# Patient Record
Sex: MALE | Race: WHITE | URBAN - METROPOLITAN AREA
[De-identification: names, ages, dates, MRNs, and addresses within clinical notes are randomized per-mention and may not be internally consistent; named-entity substitution may affect disease eponyms.]

---

## 2017-04-12 ENCOUNTER — HISTORICAL (OUTPATIENT)
Dept: ADMINISTRATIVE | Facility: HOSPITAL | Age: 82
End: 2017-04-12

## 2017-04-27 ENCOUNTER — HISTORICAL (OUTPATIENT)
Dept: ADMINISTRATIVE | Facility: HOSPITAL | Age: 82
End: 2017-04-27

## 2018-09-19 ENCOUNTER — HISTORICAL (OUTPATIENT)
Dept: ADMINISTRATIVE | Facility: HOSPITAL | Age: 83
End: 2018-09-19

## 2018-09-19 LAB
ABS NEUT (OLG): 4.05 X10(3)/MCL (ref 2.1–9.2)
ALBUMIN SERPL-MCNC: 3.1 GM/DL (ref 3.4–5)
ALBUMIN/GLOB SERPL: 1 {RATIO}
ALP SERPL-CCNC: 67 UNIT/L (ref 45–117)
ALT SERPL-CCNC: 44 UNIT/L (ref 16–61)
AST SERPL-CCNC: 51 UNIT/L (ref 15–37)
BASOPHILS # BLD AUTO: 0.02 X10(3)/MCL (ref 0–0.2)
BASOPHILS NFR BLD AUTO: 0.3 % (ref 0–0.9)
BILIRUB SERPL-MCNC: 0.3 MG/DL (ref 0.2–1)
BILIRUBIN DIRECT+TOT PNL SERPL-MCNC: <0.1 MG/DL (ref 0–0.2)
BILIRUBIN DIRECT+TOT PNL SERPL-MCNC: >0.2 MG/DL (ref 0–1)
BUN SERPL-MCNC: 49 MG/DL (ref 7–18)
CALCIUM SERPL-MCNC: 9 MG/DL (ref 8.5–10.1)
CHLORIDE SERPL-SCNC: 108 MMOL/L (ref 98–107)
CO2 SERPL-SCNC: 26 MMOL/L (ref 21–32)
CREAT SERPL-MCNC: 1.74 MG/DL (ref 0.7–1.3)
EOSINOPHIL # BLD AUTO: 0.22 X10(3)/MCL (ref 0–0.9)
EOSINOPHIL NFR BLD AUTO: 3.3 % (ref 0–6.5)
ERYTHROCYTE [DISTWIDTH] IN BLOOD BY AUTOMATED COUNT: 14.6 % (ref 11.5–17)
GLOBULIN SER-MCNC: 4 GM/DL (ref 2–4)
GLUCOSE SERPL-MCNC: 114 MG/DL (ref 74–106)
HCT VFR BLD AUTO: 36.6 % (ref 42–52)
HGB BLD-MCNC: 12.2 GM/DL (ref 14–18)
IMM GRANULOCYTES # BLD AUTO: 0.02 10*3/UL (ref 0–0.02)
IMM GRANULOCYTES NFR BLD AUTO: 0.3 % (ref 0–0.43)
LYMPHOCYTES # BLD AUTO: 1.64 X10(3)/MCL (ref 0.6–4.6)
LYMPHOCYTES NFR BLD AUTO: 24.8 % (ref 16.2–38.3)
MCH RBC QN AUTO: 29.2 PG (ref 27–31)
MCHC RBC AUTO-ENTMCNC: 33.3 GM/DL (ref 33–36)
MCV RBC AUTO: 87.6 FL (ref 80–94)
MONOCYTES # BLD AUTO: 0.66 X10(3)/MCL (ref 0.1–1.3)
MONOCYTES NFR BLD AUTO: 10 % (ref 4.7–11.3)
NEUTROPHILS # BLD AUTO: 4.05 X10(3)/MCL (ref 2.1–9.2)
NEUTROPHILS NFR BLD AUTO: 61.3 % (ref 49.1–73.4)
NRBC BLD AUTO-RTO: 0 % (ref 0–0.2)
PLATELET # BLD AUTO: 164 X10(3)/MCL (ref 130–400)
PMV BLD AUTO: 10.9 FL (ref 7.4–10.4)
POTASSIUM SERPL-SCNC: 3.6 MMOL/L (ref 3.5–5.1)
PREALB SERPL-MCNC: 20.6 MG/DL (ref 20–40)
PROT SERPL-MCNC: 6.8 GM/DL (ref 6.4–8.2)
RBC # BLD AUTO: 4.18 X10(6)/MCL (ref 4.7–6.1)
SODIUM SERPL-SCNC: 146 MMOL/L (ref 136–145)
WBC # SPEC AUTO: 6.6 X10(3)/MCL (ref 4.5–11.5)

## 2018-09-24 LAB
ABS NEUT (OLG): 4.85 X10(3)/MCL (ref 2.1–9.2)
ALBUMIN SERPL-MCNC: 2.8 GM/DL (ref 3.4–5)
BASOPHILS # BLD AUTO: 0.02 X10(3)/MCL (ref 0–0.2)
BASOPHILS NFR BLD AUTO: 0.3 % (ref 0–0.9)
BUN SERPL-MCNC: 42 MG/DL (ref 7–18)
CALCIUM SERPL-MCNC: 8.6 MG/DL (ref 8.5–10.1)
CHLORIDE SERPL-SCNC: 111 MMOL/L (ref 98–107)
CO2 SERPL-SCNC: 27 MMOL/L (ref 21–32)
CREAT SERPL-MCNC: 1.94 MG/DL (ref 0.7–1.3)
CREAT/UREA NIT SERPL: 22
EOSINOPHIL # BLD AUTO: 0.19 X10(3)/MCL (ref 0–0.9)
EOSINOPHIL NFR BLD AUTO: 2.7 % (ref 0–6.5)
ERYTHROCYTE [DISTWIDTH] IN BLOOD BY AUTOMATED COUNT: 15.3 % (ref 11.5–17)
GLUCOSE SERPL-MCNC: 172 MG/DL (ref 74–106)
HCT VFR BLD AUTO: 33.9 % (ref 42–52)
HGB BLD-MCNC: 10.7 GM/DL (ref 14–18)
IMM GRANULOCYTES # BLD AUTO: 0.02 10*3/UL (ref 0–0.02)
IMM GRANULOCYTES NFR BLD AUTO: 0.3 % (ref 0–0.43)
LYMPHOCYTES # BLD AUTO: 1.32 X10(3)/MCL (ref 0.6–4.6)
LYMPHOCYTES NFR BLD AUTO: 18.9 % (ref 16.2–38.3)
MCH RBC QN AUTO: 28.5 PG (ref 27–31)
MCHC RBC AUTO-ENTMCNC: 31.6 GM/DL (ref 33–36)
MCV RBC AUTO: 90.4 FL (ref 80–94)
MONOCYTES # BLD AUTO: 0.59 X10(3)/MCL (ref 0.1–1.3)
MONOCYTES NFR BLD AUTO: 8.4 % (ref 4.7–11.3)
NEUTROPHILS # BLD AUTO: 4.85 X10(3)/MCL (ref 2.1–9.2)
NEUTROPHILS NFR BLD AUTO: 69.4 % (ref 49.1–73.4)
NRBC BLD AUTO-RTO: 0 % (ref 0–0.2)
PLATELET # BLD AUTO: 165 X10(3)/MCL (ref 130–400)
PMV BLD AUTO: 11.2 FL (ref 7.4–10.4)
POTASSIUM SERPL-SCNC: 4.5 MMOL/L (ref 3.5–5.1)
PROT SERPL-MCNC: 6.3 GM/DL (ref 6.4–8.2)
RBC # BLD AUTO: 3.75 X10(6)/MCL (ref 4.7–6.1)
SODIUM SERPL-SCNC: 146 MMOL/L (ref 136–145)
WBC # SPEC AUTO: 7 X10(3)/MCL (ref 4.5–11.5)

## 2018-09-26 LAB
BUN SERPL-MCNC: 42 MG/DL (ref 7–18)
CALCIUM SERPL-MCNC: 8.2 MG/DL (ref 8.5–10.1)
CHLORIDE SERPL-SCNC: 114 MMOL/L (ref 98–107)
CO2 SERPL-SCNC: 27 MMOL/L (ref 21–32)
CREAT SERPL-MCNC: 1.98 MG/DL (ref 0.7–1.3)
CREAT/UREA NIT SERPL: 21
GLUCOSE SERPL-MCNC: 117 MG/DL (ref 74–106)
POTASSIUM SERPL-SCNC: 4.3 MMOL/L (ref 3.5–5.1)
SODIUM SERPL-SCNC: 148 MMOL/L (ref 136–145)

## 2018-10-19 ENCOUNTER — HISTORICAL (OUTPATIENT)
Dept: RADIOLOGY | Facility: HOSPITAL | Age: 83
End: 2018-10-19

## 2018-10-27 ENCOUNTER — HOSPITAL ENCOUNTER (OUTPATIENT)
Dept: NUTRITION | Facility: HOSPITAL | Age: 83
End: 2018-10-28
Attending: INTERNAL MEDICINE | Admitting: INTERNAL MEDICINE

## 2018-10-27 LAB
ABS NEUT (OLG): 5.26 X10(3)/MCL (ref 2.1–9.2)
ABS NEUT (OLG): 5.84 X10(3)/MCL (ref 2.1–9.2)
ALBUMIN SERPL-MCNC: 2.7 GM/DL (ref 3.4–5)
ALBUMIN SERPL-MCNC: 2.8 GM/DL (ref 3.4–5)
ALBUMIN/GLOB SERPL: 0.6 {RATIO}
ALBUMIN/GLOB SERPL: 0.8 {RATIO}
ALP SERPL-CCNC: 79 UNIT/L (ref 50–136)
ALP SERPL-CCNC: 79 UNIT/L (ref 50–136)
ALT SERPL-CCNC: 19 UNIT/L (ref 12–78)
ALT SERPL-CCNC: 21 UNIT/L (ref 12–78)
ANION GAP SERPL CALC-SCNC: 16 MMOL/L
APTT PPP: 35.7 SECOND(S) (ref 24.8–36.9)
APTT PPP: 45 SECOND(S) (ref 24.8–36.9)
AST SERPL-CCNC: 13 UNIT/L (ref 15–37)
AST SERPL-CCNC: 14 UNIT/L (ref 15–37)
BASOPHILS # BLD AUTO: 0 X10(3)/MCL (ref 0–0.2)
BASOPHILS # BLD AUTO: 0 X10(3)/MCL (ref 0–0.2)
BASOPHILS NFR BLD AUTO: 0 %
BASOPHILS NFR BLD AUTO: 0 %
BILIRUB SERPL-MCNC: 0.3 MG/DL (ref 0.2–1)
BILIRUB SERPL-MCNC: 0.4 MG/DL (ref 0.2–1)
BILIRUBIN DIRECT+TOT PNL SERPL-MCNC: 0.1 MG/DL (ref 0–0.2)
BILIRUBIN DIRECT+TOT PNL SERPL-MCNC: 0.1 MG/DL (ref 0–0.2)
BILIRUBIN DIRECT+TOT PNL SERPL-MCNC: 0.2 MG/DL (ref 0–0.8)
BILIRUBIN DIRECT+TOT PNL SERPL-MCNC: 0.3 MG/DL (ref 0–0.8)
BUN SERPL-MCNC: 15 MG/DL (ref 7–18)
BUN SERPL-MCNC: 17 MG/DL (ref 7–18)
BUN SERPL-MCNC: 18 MG/DL (ref 8–26)
CALCIUM SERPL-MCNC: 8.2 MG/DL (ref 8.5–10.1)
CALCIUM SERPL-MCNC: 8.7 MG/DL (ref 8.5–10.1)
CHLORIDE SERPL-SCNC: 101 MMOL/L (ref 98–109)
CHLORIDE SERPL-SCNC: 106 MMOL/L (ref 98–107)
CHLORIDE SERPL-SCNC: 106 MMOL/L (ref 98–107)
CHOLEST SERPL-MCNC: 101 MG/DL (ref 0–200)
CHOLEST/HDLC SERPL: 3.1 {RATIO} (ref 0–5)
CK SERPL-CCNC: 29 UNIT/L (ref 39–308)
CO2 SERPL-SCNC: 27 MMOL/L (ref 21–32)
CO2 SERPL-SCNC: 28 MMOL/L (ref 21–32)
CREAT SERPL-MCNC: 1.47 MG/DL (ref 0.7–1.3)
CREAT SERPL-MCNC: 1.5 MG/DL (ref 0.6–1.3)
CREAT SERPL-MCNC: 1.61 MG/DL (ref 0.7–1.3)
CRP SERPL HS-MCNC: 82 MG/L (ref 0–3)
EOSINOPHIL # BLD AUTO: 0 X10(3)/MCL (ref 0–0.9)
EOSINOPHIL # BLD AUTO: 0 X10(3)/MCL (ref 0–0.9)
EOSINOPHIL NFR BLD AUTO: 0 %
EOSINOPHIL NFR BLD AUTO: 0 %
ERYTHROCYTE [DISTWIDTH] IN BLOOD BY AUTOMATED COUNT: 14 % (ref 11.5–17)
ERYTHROCYTE [DISTWIDTH] IN BLOOD BY AUTOMATED COUNT: 14.1 % (ref 11.5–17)
ERYTHROCYTE [SEDIMENTATION RATE] IN BLOOD: 87 MM/HR (ref 0–15)
EST. AVERAGE GLUCOSE BLD GHB EST-MCNC: 137 MG/DL
GLOBULIN SER-MCNC: 3.6 GM/DL (ref 2.4–3.5)
GLOBULIN SER-MCNC: 4.4 GM/DL (ref 2.4–3.5)
GLUCOSE SERPL-MCNC: 136 MG/DL (ref 74–106)
GLUCOSE SERPL-MCNC: 149 MG/DL (ref 74–106)
GLUCOSE SERPL-MCNC: 151 MG/DL (ref 70–105)
HBA1C MFR BLD: 6.4 % (ref 4.2–6.3)
HCT VFR BLD AUTO: 31.8 % (ref 42–52)
HCT VFR BLD AUTO: 33.1 % (ref 42–52)
HCT VFR BLD CALC: 34 % (ref 38–51)
HDLC SERPL-MCNC: 33 MG/DL (ref 35–60)
HGB BLD-MCNC: 10 GM/DL (ref 14–18)
HGB BLD-MCNC: 11.6 MG/DL (ref 12–17)
HGB BLD-MCNC: 9.7 GM/DL (ref 14–18)
INR PPP: 1.39 (ref 0–1.27)
INR PPP: 1.4 (ref 0–1.27)
LDLC SERPL CALC-MCNC: 51 MG/DL (ref 0–129)
LYMPHOCYTES # BLD AUTO: 1.2 X10(3)/MCL (ref 0.6–4.6)
LYMPHOCYTES # BLD AUTO: 1.2 X10(3)/MCL (ref 0.6–4.6)
LYMPHOCYTES NFR BLD AUTO: 16 %
LYMPHOCYTES NFR BLD AUTO: 16 %
MCH RBC QN AUTO: 27.5 PG (ref 27–31)
MCH RBC QN AUTO: 27.6 PG (ref 27–31)
MCHC RBC AUTO-ENTMCNC: 30.2 GM/DL (ref 33–36)
MCHC RBC AUTO-ENTMCNC: 30.5 GM/DL (ref 33–36)
MCV RBC AUTO: 90.1 FL (ref 80–94)
MCV RBC AUTO: 91.4 FL (ref 80–94)
MONOCYTES # BLD AUTO: 0.7 X10(3)/MCL (ref 0.1–1.3)
MONOCYTES # BLD AUTO: 0.8 X10(3)/MCL (ref 0.1–1.3)
MONOCYTES NFR BLD AUTO: 11 %
MONOCYTES NFR BLD AUTO: 9 %
NEUTROPHILS # BLD AUTO: 5.26 X10(3)/MCL (ref 2.1–9.2)
NEUTROPHILS # BLD AUTO: 5.84 X10(3)/MCL (ref 2.1–9.2)
NEUTROPHILS NFR BLD AUTO: 72 %
NEUTROPHILS NFR BLD AUTO: 74 %
PLATELET # BLD AUTO: 226 X10(3)/MCL (ref 130–400)
PLATELET # BLD AUTO: 246 X10(3)/MCL (ref 130–400)
PMV BLD AUTO: 10.7 FL (ref 9.4–12.4)
PMV BLD AUTO: 11.1 FL (ref 9.4–12.4)
POC IONIZED CALCIUM: 1.13 MMOL/L (ref 1.12–1.32)
POC TCO2: 27 MMOL/L (ref 24–29)
POC TROPONIN: 0.01 NG/ML (ref 0–0.08)
POTASSIUM BLD-SCNC: 4.4 MMOL/L (ref 3.5–4.9)
POTASSIUM SERPL-SCNC: 4.5 MMOL/L (ref 3.5–5.1)
POTASSIUM SERPL-SCNC: 4.7 MMOL/L (ref 3.5–5.1)
PROT SERPL-MCNC: 6.3 GM/DL (ref 6.4–8.2)
PROT SERPL-MCNC: 7.2 GM/DL (ref 6.4–8.2)
PROTHROMBIN TIME: 17.5 SECOND(S) (ref 12.2–14.7)
PROTHROMBIN TIME: 17.6 SECOND(S) (ref 12.2–14.7)
RBC # BLD AUTO: 3.53 X10(6)/MCL (ref 4.7–6.1)
RBC # BLD AUTO: 3.62 X10(6)/MCL (ref 4.7–6.1)
SODIUM BLD-SCNC: 139 MMOL/L (ref 138–146)
SODIUM SERPL-SCNC: 142 MMOL/L (ref 136–145)
SODIUM SERPL-SCNC: 145 MMOL/L (ref 136–145)
TRIGL SERPL-MCNC: 86 MG/DL (ref 30–150)
TROPONIN I SERPL-MCNC: <0.02 NG/ML (ref 0.02–0.49)
VLDLC SERPL CALC-MCNC: 17 MG/DL
WBC # SPEC AUTO: 7.3 X10(3)/MCL (ref 4.5–11.5)
WBC # SPEC AUTO: 7.9 X10(3)/MCL (ref 4.5–11.5)

## 2018-10-28 LAB
ABS NEUT (OLG): 4.72 X10(3)/MCL (ref 2.1–9.2)
AMPHET UR QL SCN: NORMAL
APPEARANCE, UA: CLEAR
BACTERIA SPEC CULT: ABNORMAL /HPF
BARBITURATE SCN PRESENT UR: NORMAL
BASOPHILS # BLD AUTO: 0 X10(3)/MCL (ref 0–0.2)
BASOPHILS NFR BLD AUTO: 0 %
BENZODIAZ UR QL SCN: NORMAL
BILIRUB UR QL STRIP: NEGATIVE
BUN SERPL-MCNC: 14 MG/DL (ref 7–18)
CALCIUM SERPL-MCNC: 8.5 MG/DL (ref 8.5–10.1)
CANNABINOIDS UR QL SCN: NORMAL
CHLORIDE SERPL-SCNC: 105 MMOL/L (ref 98–107)
CO2 SERPL-SCNC: 26 MMOL/L (ref 21–32)
COCAINE UR QL SCN: NORMAL
COLOR UR: YELLOW
CREAT SERPL-MCNC: 1.34 MG/DL (ref 0.7–1.3)
CREAT/UREA NIT SERPL: 10.4
EOSINOPHIL # BLD AUTO: 0.1 X10(3)/MCL (ref 0–0.9)
EOSINOPHIL NFR BLD AUTO: 1 %
ERYTHROCYTE [DISTWIDTH] IN BLOOD BY AUTOMATED COUNT: 13.9 % (ref 11.5–17)
GLUCOSE (UA): NEGATIVE
GLUCOSE SERPL-MCNC: 110 MG/DL (ref 74–106)
HCT VFR BLD AUTO: 30.4 % (ref 42–52)
HGB BLD-MCNC: 9.5 GM/DL (ref 14–18)
HGB UR QL STRIP: ABNORMAL
KETONES UR QL STRIP: ABNORMAL
LEUKOCYTE ESTERASE UR QL STRIP: NEGATIVE
LYMPHOCYTES # BLD AUTO: 1.2 X10(3)/MCL (ref 0.6–4.6)
LYMPHOCYTES NFR BLD AUTO: 18 %
MCH RBC QN AUTO: 28.3 PG (ref 27–31)
MCHC RBC AUTO-ENTMCNC: 31.3 GM/DL (ref 33–36)
MCV RBC AUTO: 90.5 FL (ref 80–94)
MONOCYTES # BLD AUTO: 0.7 X10(3)/MCL (ref 0.1–1.3)
MONOCYTES NFR BLD AUTO: 11 %
NEUTROPHILS # BLD AUTO: 4.72 X10(3)/MCL (ref 2.1–9.2)
NEUTROPHILS NFR BLD AUTO: 69 %
NITRITE UR QL STRIP: NEGATIVE
OPIATES UR QL SCN: NORMAL
PCP UR QL: NORMAL
PH UR STRIP.AUTO: 7.5 [PH] (ref 5–7.5)
PH UR STRIP: 7.5 [PH] (ref 5–9)
PLATELET # BLD AUTO: 240 X10(3)/MCL (ref 130–400)
PMV BLD AUTO: 10.6 FL (ref 9.4–12.4)
POTASSIUM SERPL-SCNC: 4.1 MMOL/L (ref 3.5–5.1)
PROT UR QL STRIP: ABNORMAL
RBC # BLD AUTO: 3.36 X10(6)/MCL (ref 4.7–6.1)
RBC #/AREA URNS HPF: 8 /HPF (ref 0–2)
SODIUM SERPL-SCNC: 141 MMOL/L (ref 136–145)
SP GR FLD REFRACTOMETRY: 1.01 (ref 1–1.03)
SP GR UR STRIP: 1.01 (ref 1–1.03)
SQUAMOUS EPITHELIAL, UA: ABNORMAL
UROBILINOGEN UR STRIP-ACNC: 0.2
WBC # SPEC AUTO: 6.8 X10(3)/MCL (ref 4.5–11.5)
WBC #/AREA URNS HPF: ABNORMAL /HPF

## 2022-04-30 NOTE — H&P
Patient:   Emmett Starks            MRN: [id number]            FIN: [id number]               Age:   83 years     Sex:  Male     :  11/15/1934   Associated Diagnoses:   None   Author:   Kumar Hewitt MD      Basic Information   Time Seen:  Date & Time 10/27/2018 22:48:00.    Source of history:  Self.    Referral source:  Emergency department.    History limitation:  None.    Advance directive:  Full code.    Provider information/ cc:  Dr. Uziel Garland.       Chief Complaint   Generalized weakness with a right facial droop      History of Present Illness   83-year-old  male with past medical history of TIAs and CVAs with the most recent being 6 weeks ago on Xarelto presents to the emergency department with complaints of globalized weakness with a right facial droop that started around 3 PM this afternoon.  Wife reports the patient was sitting on the couch when he was unable to get up due to severe weakness.  She also reports that she noticed the right side of his face was drooping.  By the time they arrived into the emergency department his facial droop was resolved but he still had global weakness. NIH score upon arrival to the emerge department was 2.  CT of the head was negative for acute intracranial abnormality.  He was admitted approximately 6 weeks ago and was started on a baby aspirin daily.  He was discharged to a [facility] facility where he was receiving PT 3 times a day.  He is currently at home and he is still going to physical therapy.  At baseline he ambulates with a walker and his wife states he has been pretty strong up until today.  No reports of fever, chills, chest pain, abdominal pain, nausea, vomiting or any urinary complaints.  Due to his symptoms he will be admitted to the hospitalist service for stroke workup.       Review of Systems   Except as documented, all other systems reviewed and negative      Health Status   Allergies:    Allergic Reactions (Selected)  No Known  Allergies,    Allergies (1) Active Reaction  No Known Allergies None Documented   Current medications:  (Selected)   Inpatient Medications  Ordered  IVF Normal Saline NS Bolus 250ml 250 mL: 250 mL, 250 mL, IV, 250 mL/hr, start date 10/27/18 16:35:00 CDT, bolus dose: 250 mL  IVF Normal Saline NS Infusion 1,000 mL: 1,000 mL, 1,000 mL, IV, 75 mL/hr, start date 10/27/18 16:35:00 CDT  Prescriptions  Prescribed  Mavik 4 mg oral tablet: 4 mg = 1 tab(s), Oral, Daily, # 30 tab(s), 0 Refill(s), Pharmacy: [facility]  Multiple Vitamins with Minerals oral capsule: 1 cap(s), Oral, Daily, # 30 cap(s), 0 Refill(s), Pharmacy: [facility]  Protonix 40 mg ORAL enteric coated tablet: 40 mg = 1 tab(s), Oral, Daily, # 30 tab(s), 0 Refill(s), Pharmacy: [facility]  Xarelto 20mg Tablet: 20 mg = 1 tab(s), Oral, qPM, # 30 tab(s), 0 Refill(s), Pharmacy: [facility]  aspirin 81 mg oral Delayed Release (EC) tablet: 81 mg = 1 tab(s), Oral, Daily, # 30 tab(s), 0 Refill(s), Pharmacy: [facility]  aspirin 81 mg oral Delayed Release (EC) tablet: 81 mg = 1 tab(s), Oral, Daily, # 30 tab(s), 4 Refill(s)  ferrous sulfate 325 mg (65 mg elemental iron) oral tablet: 325 mg, Oral, BID, # 60 tab(s), 0 Refill(s), Pharmacy: [facility]  glipiZIDE 10 mg oral tablet: 20 mg = 2 tab(s), Oral, BIDWMeal, # 120 tab(s), 0 Refill(s), Pharmacy: [facility]  lisinopril 10 mg oral tablet: 20 mg = 2 tab(s), Oral, Daily, # 60 tab(s), 0 Refill(s), Pharmacy: [facility]  omega-3 polyunsaturated fatty acids ethyl esters 1000 mg oral capsule: 1,000 mg = 1 cap(s), Oral, Daily, # 30 cap(s), 0 Refill(s), Pharmacy: [facility]  simvastatin 40 mg oral tablet: 40 mg = 1 tab(s), Oral, Once a day (at bedtime), # 30 tab(s), 0 Refill(s), Pharmacy: [facility]  timolol maleate 0.5% ophthalmic solution: 1 drop(s), Eye-Both, BID, # 2 mL, 0 Refill(s), Pharmacy:  [facility]  Documented Medications  Documented  Fergon: 320 mg, Oral, Daily, 0 Refill(s)  Fish Oil oral capsule: 1 cap(s), Oral, Daily, 0 Refill(s)  PriLOSEC OTC: 20 mg, Oral, Daily, 0 Refill(s)  Xarelto 20mg Tablet: 20 mg = 1 tab(s), Oral, qPM, 0 Refill(s)  dorzolamide-timolol 2.23%-0.68% ophthalmic solution: 1 drop(s), Eye-Both, BID, 0 Refill(s)  metformin 500 mg oral tablet: 250 mg = 0.5 tab(s), Oral, BID, # 60 tab(s), 0 Refill(s)  multivitamin with minerals (Adult Tab): 1 tab(s), Oral, Daily, 0 Refill(s)  simvastatin 20 mg oral tablet: 20 mg = 1 tab(s), Oral, Once a day (at bedtime), # 30 tab(s), 0 Refill(s)   Problem list:    All Problems  Activity intolerance / SNOMED CT 366025104 / Confirmed / Improving  ADL / SNOMED CT 844261356 / Confirmed / Improving  Stroke / SNOMED CT 349799962 / Confirmed  Cognitive impairment                                                                                                                                                                                                                    12-NOV-2012 17:49:53<$> / SNOMED CT [id number] / Confirmed / Improving  Communication impairment / SNOMED CT 630370338 / Confirmed  Problem added by Discern Expert  Endurance / SNOMED CT 3655469124 / Confirmed / Improving  Mobility / SNOMED CT 688038886 / Confirmed  Pressure ulcer stage 1 / SNOMED CT 6458617903 / Confirmed  Seen by physician / SNOMED CT 084936882 / Confirmed      Histories     Past Medical History:  Hx ruptured duodenal ulcer, DM, Hx CVA  Past Surgical History: Appendectomy, Tonsillectomy, Adenoidectomy, Inguinal hernia repair, 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           Physical Examination      Vital Signs (last 24 hrs)_____  Last Charted___________  Temp Oral     37.1 DegC  (OCT 27 21:54)  Heart Rate Peripheral   87 bpm  (OCT 27  21:54)  Resp Rate         20 br/min  (OCT 27 21:30)  SBP      H 156mmHg  (OCT 27 21:54)  DBP      75 mmHg  (OCT 27 21:54)  SpO2      L 92%  (OCT 27 21:54)  Weight      63.0 kg  (OCT 27 16:32)   General:  Alert and oriented, No acute distress.    Cognition and Speech:  Oriented, Speech clear and coherent.    HENT:  Normocephalic, Normal hearing, Oral mucosa is moist.    Eye:  Pupils are equal, round and reactive to light, Normal conjunctiva.    Neck:  Supple, No carotid bruit, No jugular venous distention.    Respiratory:  Lungs are clear to auscultation, Respirations are non-labored, Breath sounds are equal.    Cardiovascular:  Normal rate, Regular rhythm, No murmur, No edema.    Gastrointestinal:  Soft, Non-tender, Non-distended, Normal bowel sounds.    Integumentary:  Warm, Dry, Intact.    Musculoskeletal:  Normal strength, No tenderness, No swelling.    Neurologic:  Alert, Oriented, Normal sensory, Normal motor function, No focal deficits, Cranial Nerves II-XII are grossly intact,  strenght 5/5 BUE; motor strength 5/5, no facial droop. Gait not assessed.    Psychiatric:  Cooperative, Appropriate mood & affect, Normal judgment.       Review / Management   Laboratory Results   Today's Lab Results : PowerNote Discrete Results   10/27/2018 16:54 CDT     POC Troponin              0.01 ng/mL    10/27/2018 16:53 CDT     POC TCO2                  27.0 mmol/L                             POC Hb                    11.6 mg/dL  LOW                             POC Hct                   34.0 %  LOW                             POC Sodium                139 mmol/L                             POC Potassium             4.4 mmol/L                             POC Chloride              101 mmol/L                             POC Ion Calcium           1.13 mmol/L                             POC Glucose               151 mg/dL  HI                             POC BUN                   18.0 mg/dL                             POC  Creatinine            1.5 mg/dL  HI                             POC AGAP                  16.0  NA    10/27/2018 16:50 CDT     WBC                       7.9 x10(3)/mcL                             RBC                       3.62 x10(6)/mcL  LOW                             Hgb                       10.0 gm/dL  LOW                             Hct                       33.1 %  LOW                             Platelet                  246 x10(3)/mcL                             MCV                       91.4 fL                             MCH                       27.6 pg                             MCHC                      30.2 gm/dL  LOW                             RDW                       14.1 %                             MPV                       11.1 fL                             Abs Neut                  5.84 x10(3)/mcL                             Neutro Auto               74 %  NA                             Lymph Auto                16 %  NA                             Mono Auto                 9 %  NA                             Eos Auto                  0 %  NA                             Abs Eos                   0.0 x10(3)/mcL                             Basophil Auto             0 %  NA                             Abs Neutro                5.84 x10(3)/mcL                             Abs Lymph                 1.2 x10(3)/mcL                             Abs Mono                  0.7 x10(3)/mcL                             Abs Baso                  0.0 x10(3)/mcL                             PT                        17.6 second(s)  HI                             POC PT                    18.5 second(s)  HI                             INR                       1.40  HI                             POC INR                   1.6  HI                             PTT                       45.0 second(s)  HI                             Sodium Lvl                142 mmol/L                             Potassium Lvl              4.7 mmol/L                             Chloride                  106 mmol/L                             CO2                       27.0 mmol/L                             Calcium Lvl               8.7 mg/dL                             Glucose Lvl               149 mg/dL  HI                             BUN                       17.0 mg/dL                             Creatinine                1.61 mg/dL  HI                             eGFR-AA                   53 mL/min/1.73 m2  NA                             eGFR-REY                  44 mL/min/1.73 m2  NA                             Bili Total                0.3 mg/dL                             Bili Direct               0.10 mg/dL                             Bili Indirect             0.20 mg/dL                             AST                       14 unit/L  LOW                             ALT                       21 unit/L                             Alk Phos                  79 unit/L                             Total Protein             7.2 gm/dL                             Albumin Lvl               2.80 gm/dL  LOW                             Globulin                  4.40 gm/dL  HI                             A/G Ratio                 0.6  NA        Radiology results   Rad Results (ST)   Accession: [id number]  Order: CT Head W/O Contrast  Report Dt/Tm: 10/27/2018 16:54  Report:   Clinical History:  Code fast     Technique:  Axial CT of the brain were obtained without contrast. There are  osseous reconstructed images available for review with coronal and  sagittal reconstructions.     Automatic exposure control was utilized to reduce the patient's  radiation dose.     Comparison:  September 15, 2018     Findings:  No acute intracranial hemorrhage.  Diffuse cerebral atrophy with concordant ventricular enlargement.   . Scattered hypodensities throughout the deep periventricular white  matter.   The osseous structures are normal.   The mastoid air cells are  clear.   The auditory canals are patent bilaterally.  The globes and orbital contents are normal bilaterally.  Unchanged opacification of the bilateral maxillary sinuses.  Opacification of the frontal sinus.     Impression  No acute intracranial hemorrhage. Findings of microvascular ischemic  disease.   Frontal and maxillary opacification remaining.          Impression and Plan   Generalized weakness with a right facial droop  - Right facial droop resolved prior to arrival. pt still with generalized weakness  - Stroke protocol initiated.  MRI Brain w/wot contrast in the morning.  No need for repeat echo or carotid ultrasound at this time.  - Telemetry  - Neurology consulted  - Neurochecks Q4H  - resume Xarelto and ASA 81mg QD  - Consult PT  - Will check UA to r/o infection as the cause of his weakness.     Previous CVA with Right sided weakness and dysphagia  - NPO for now, when able to eat place on nectar thickened liquids    DM, Type II  - monitor CBGs, resume home meds    I, Ena Benito, ROHINIP-C have reviewed and disussed this case with Dr. Hewitt    Code status: Full  DVT prophylaxis: On Xarelto  Admission time 75 minutes.       Professional Services   I, Kumar Hewitt MD, assumed care of this patient at the time of this addendum and assisted with the composition of the above assesment and plan. For this patient encounter, I reviewed the NP or PA documentation, treatment plan, and medical decision making; and I had face-to-face time with this patient.  Labs and imaging were reviewed and I agree with history, physical and medical decision making as detailed above.      83-year-old male brought into the ER due to lower extremity weakness and a noted right facial droop by family members, has a history of CVAs in the past as well as chronic dysphasia and he is receiving home physical therapy and speech therapy.  Currently patient has no appreciable deficits on either side of his body, no appreciable facial  droop at this time.  Continue stroke workup as above.

## 2022-04-30 NOTE — ED PROVIDER NOTES
Patient:   Emmett Starks            MRN: [id number]            FIN: [id number]               Age:   83 years     Sex:  Male     :  11/15/1934   Associated Diagnoses:   Brain TIA   Author:   Emiliano Godfrey MD      Basic Information   Time seen: Date & time 10/27/2018 16:37:00.   History source: Patient, EMS.   Arrival mode: Private vehicle.   History limitation: None.   Additional information: Chief Complaint from Nursing Triage Note : Chief Complaint   10/27/2018 16:32 CDT     Chief Complaint           Pt c/o generalized weakness since noon, daughter states she noticed a R mouth droop that he has not had in the past.  Prev hx cva with tpa 2014 tia in   cbg 150  (Modified) .      History of Present Illness   The patient presents with     83 year old male presents to the ED with complaints of headache onset 1200 with generalized weakness. The patient is reported by the family to have had the above complaints before they noted that he may have had a right sided facial droop. The family states that the patient has a history of right sided deficits following his prior TIA's. The patient continues to take Xarelto. The patient has a history of HTN, DM, IBS, TIA, & CVA.    .  The onset was 10/27/2018 12:00:00 .  The course/duration of symptoms is constant.  Location: Right face. The character of symptoms is weakness.  The degree at onset was moderate.  The degree at maximum was moderate.  The degree at present is moderate.  Risk factors consist of diabetes mellitus and hypertension.  Prior episodes: none.  Therapy today: emergency medical services.  Associated symptoms: none.  Additional history: none.        Review of Systems   Constitutional symptoms:  Weakness (generalized).   Skin symptoms:  Negative except as documented in HPI.   Eye symptoms:  Negative except as documented in HPI.   ENMT symptoms:  Negative except as documented in HPI.   Respiratory symptoms:  Negative except as documented in HPI.    Cardiovascular symptoms:  Negative except as documented in HPI.   Gastrointestinal symptoms:  Negative except as documented in HPI.   Genitourinary symptoms:  Negative except as documented in HPI.   Musculoskeletal symptoms:  Negative except as documented in HPI.   Neurologic symptoms:  Headache, right sided facial droop per family.    Psychiatric symptoms:  Negative except as documented in HPI.   Endocrine symptoms:  Negative except as documented in HPI.   Hematologic/Lymphatic symptoms:  Negative except as documented in HPI.   Allergy/immunologic symptoms:  Negative except as documented in HPI.             Additional review of systems information: All other systems reviewed and otherwise negative.      Health Status   Allergies: No known allergies.   Medications:  (Selected)   Inpatient Medications  Ordered  IVF Normal Saline NS Bolus 250ml 250 mL: 250 mL, 250 mL, IV, 250 mL/hr, start date 10/27/18 16:35:00 CDT, bolus dose: 250 mL  IVF Normal Saline NS Infusion 1,000 mL: 1,000 mL, 1,000 mL, IV, 75 mL/hr, start date 10/27/18 16:35:00 CDT  Prescriptions  Prescribed  Mavik 4 mg oral tablet: 4 mg = 1 tab(s), Oral, Daily, # 30 tab(s), 0 Refill(s), Pharmacy: [facility]  Multiple Vitamins with Minerals oral capsule: 1 cap(s), Oral, Daily, # 30 cap(s), 0 Refill(s), Pharmacy: [facility]  Protonix 40 mg ORAL enteric coated tablet: 40 mg = 1 tab(s), Oral, Daily, # 30 tab(s), 0 Refill(s), Pharmacy: [facility]  Xarelto 20mg Tablet: 20 mg = 1 tab(s), Oral, qPM, # 30 tab(s), 0 Refill(s), Pharmacy: [facility]  aspirin 81 mg oral Delayed Release (EC) tablet: 81 mg = 1 tab(s), Oral, Daily, # 30 tab(s), 0 Refill(s), Pharmacy: [facility]  aspirin 81 mg oral Delayed Release (EC) tablet: 81 mg = 1 tab(s), Oral, Daily, # 30 tab(s), 4 Refill(s)  ferrous sulfate 325 mg (65 mg elemental iron) oral tablet: 325 mg, Oral, BID, # 60 tab(s), 0 Refill(s), Pharmacy: [facility]  glipiZIDE 10 mg oral tablet: 20 mg = 2 tab(s), Oral, BIDWMeal, # 120 tab(s), 0 Refill(s), Pharmacy: [facility]  lisinopril 10 mg oral tablet: 20 mg = 2 tab(s), Oral, Daily, # 60 tab(s), 0 Refill(s), Pharmacy: [facility]  omega-3 polyunsaturated fatty acids ethyl esters 1000 mg oral capsule: 1,000 mg = 1 cap(s), Oral, Daily, # 30 cap(s), 0 Refill(s), Pharmacy: [facility]  simvastatin 40 mg oral tablet: 40 mg = 1 tab(s), Oral, Once a day (at bedtime), # 30 tab(s), 0 Refill(s), Pharmacy: [facility]  timolol maleate 0.5% ophthalmic solution: 1 drop(s), Eye-Both, BID, # 2 mL, 0 Refill(s), Pharmacy: [facility]  Documented Medications  Documented  Fergon: 320 mg, Oral, Daily, 0 Refill(s)  Fish Oil oral capsule: 1 cap(s), Oral, Daily, 0 Refill(s)  PriLOSEC OTC: 20 mg, Oral, Daily, 0 Refill(s)  Xarelto 20mg Tablet: 20 mg = 1 tab(s), Oral, qPM, 0 Refill(s)  dorzolamide-timolol 2.23%-0.68% ophthalmic solution: 1 drop(s), Eye-Both, BID, 0 Refill(s)  metformin 500 mg oral tablet: 250 mg = 0.5 tab(s), Oral, BID, # 60 tab(s), 0 Refill(s)  multivitamin with minerals (Adult Tab): 1 tab(s), Oral, Daily, 0 Refill(s)  simvastatin 20 mg oral tablet: 20 mg = 1 tab(s), Oral, Once a day (at bedtime), # 30 tab(s), 0 Refill(s).   Immunizations: Per nurse's notes.      Past Medical/ Family/ Social History   Medical history:    Resolved  CVA (cerebral infarction) ([id number]): Onset on 12/4/2014 at 80 years.  Resolved.  carpal tunnel ([id number]):  Resolved.  duodenal ulcer ([id number]):  Resolved.  IBS ([id number]):  Resolved.  colon polyps ([id number]):  Resolved.  arthritis (6763857):  Resolved.  vertigo ([id number]):  Resolved.  diabetes (732827440):  Resolved.  tendonitis (90017268):  Resolved.  HTN (hypertension) ([id number]):  Resolved.  Glasses ([id number]):  Resolved.  Glaucoma (17761224):   Resolved.  Cataracts (5897269949):  Resolved.  Seasonal allergies ([id number]):  Resolved.  Hearing loss (16276551):  Resolved.  Able to lie down (165644333):  Resolved.  Activity intolerance ([id number]):  Resolved.  Enlarged prostate (205025810):  Resolved.  Walker ([id number]):  Resolved..   Surgical history:    64344 - LT CATARACT W/IOL 1 STA PHACO (Left) performed by Serafin Obregon MD on 4/27/2017 at 82 Years.  Comments:  4/27/2017 11:53 - Ingrid Yousif RN.  auto-populated from documented surgical case  76606 - RT CATARACT W/IOL 1 STA PHACO (Right) performed by Serafin Obregon MD on 4/12/2017 at 82 Years.  Comments:  4/12/2017 09:40 - Hernandez WHITMAN, Peggy PEACE  auto-populated from documented surgical case  tonsillectomy.  appendectomy.  left illium tumor removal.  inguinal hernia repair.  Colonoscopy (SNOMED CT [id number]).  R knee surgery.  carpal tunnel surgery.  ruptured intestinal ulcer repair..   Family history:    Stroke  Father  Glaucoma  Father  Diabetes mellitus type 1.  Father  .   Social history: Alcohol use: Denies, Tobacco use: Denies, Drug use: Denies.   Problem list: Per nurse's notes.      Physical Examination               Vital Signs   Vital Signs   10/27/2018 16:45 CDT     Peripheral Pulse Rate     94 bpm                             Heart Rate Monitored      77 bpm                             Respiratory Rate          24 br/min                             SpO2                      96 %                             Oxygen Therapy            Nasal cannula                             Oxygen Flow Rate          2 L/min                             Systolic Blood Pressure   173 mmHg  HI                             Diastolic Blood Pressure  86 mmHg                             Mean Arterial Pressure, Cuff              115 mmHg    10/27/2018 16:32 CDT     Temperature Oral          37.4 DegC                             Temperature Oral (calculated)              99.32 DegF                             Peripheral Pulse Rate     78 bpm                             Respiratory Rate          24 br/min                             SpO2                      95 %                             Systolic Blood Pressure   173 mmHg  HI                             Diastolic Blood Pressure  86 mmHg  .   Measurements   10/27/2018 16:32 CDT     Weight Dosing             63.0 kg                             Weight Measured           63.0 kg                             Weight Measured and Calculated in Lbs     138.89 lb  .   Basic Oxygen Information   10/27/2018 16:45 CDT     SpO2                      96 %                             Oxygen Therapy            Nasal cannula                             Oxygen Flow Rate          2 L/min    10/27/2018 16:32 CDT     SpO2                      95 %  .   General:  Alert, no acute distress, globally weak and fraile appearing.    Skin:  Warm, dry.    Head:  Normocephalic, atraumatic.    Neck:  Supple, trachea midline, no tenderness.    Eye:  Pupils are equal, round and reactive to light, extraocular movements are intact.    Ears, nose, mouth and throat:  Oral mucosa moist, no pharyngeal erythema or exudate.    Cardiovascular:  Regular rate and rhythm, No murmur, Normal peripheral perfusion, No edema.    Respiratory:  Lungs are clear to auscultation, respirations are non-labored, breath sounds are equal, Symmetrical chest wall expansion.    Chest wall:  No tenderness.   Back:  Nontender, Normal range of motion, Normal alignment.    Musculoskeletal:  Normal ROM, normal strength, no tenderness.    Gastrointestinal:  Soft, Nontender, Non distended, Normal bowel sounds.    Neurological:  Alert and oriented to person, place, time, and situation, No focal neurological deficit observed, CN II-XII intact.    Lymphatics:  No lymphadenopathy.   Psychiatric:  Cooperative, appropriate mood & affect, normal judgment.       Medical Decision Making   Documents reviewed:   Emergency department nurses' notes.   Orders  Launch Order Profile (Selected)   Inpatient Orders  Ordered  Blood Glucose Monitoring POC: 10/27/18 16:35:00 CDT, Stop date 10/27/18 16:35:00 CDT, Blood, Stat, 10/27/18 16:35:00 CDT  Blood Pressure: 10/27/18 16:35:00 CDT, Stop date 10/27/18 16:35:00 CDT, monitor blood pressure.  Cardiac Monitoring: 10/27/18 16:35:00 CDT, Constant Order, place on telemetry monitor.  Discharge Planning Ongoing Assessment: 10/30/18 9:00:00 CDT, q3day  Fall Risk Protocol: 10/27/18 16:52:19 CDT, Constant Order  HOB flat.: 10/27/18 16:35:00 CDT, HOB flat.  IVF Normal Saline NS Bolus 250ml 250 mL: 250 mL, 250 mL, IV, 250 mL/hr, start date 10/27/18 16:35:00 CDT, bolus dose: 250 mL  IVF Normal Saline NS Infusion 1,000 mL: 1,000 mL, 1,000 mL, IV, 75 mL/hr, start date 10/27/18 16:35:00 CDT  If hemodynamically unstable (SBP<100  and pulse >110) bolus with 250ml NS and notify MD.: 10/27/18 16:35:00 CDT, If hemodynamically unstable (SBP<100  and pulse >110) bolus with 250ml NS and notify MD.  If onset of symptoms < 6 hours and NIH Stroke Scale is 6 or greater consult interventional neurolog...: 10/27/18 16:35:00 CDT, If onset of symptoms < 6 hours and NIH Stroke Scale is 6 or greater consult interventional neurology STAT, if available (see physician on call ER schedule) for possible endovascular therapy.  [facility] - Swallow Screening Tool: 10/27/18 16:35:00 CDT, Constant order, Prior to oral medication administration.  NIH Stroke Scale Assessment: 10/27/18 16:35:00 CDT, q1hr, abbreviated NIHSS., 10/27/18 17:00:00 CDT  NPO: 10/27/18 16:35:00 CDT, CM NPO  Oxygen Therapy: 10/27/18 16:35:00 CDT, Stat, Nasal Cannula, Maintain saturation of at least 94%., CM Oxygen  Pulse Oximetry: 10/27/18 16:35:00 CDT, Stop date 10/27/18 16:35:00 CDT, Monitor and maintain saturation of at least 95%.  Saline Lock Insert: 10/27/18 16:35:00 CDT, Stop date 10/27/18 16:35:00 CDT  Vital Signs: 10/27/18 16:35:00 CDT, q1hr, with  abbreviated NIHSS (or more frequently if indicated).  upon arrival.: 10/27/18 16:35:00 CDT, upon arrival.  Ordered (Collected)  POC ISTAT Chem8 Request:: BLOOD, STAT collect, Collected, 10/27/18 16:50:56 CDT, Stop date 10/27/18 16:50:00 CDT, Lab Collect, Print Label By Order Location  POC iSTAT ER Troponin request: BLOOD, STAT collect, Collected, 10/27/18 16:50:56 CDT, Stop date 10/27/18 16:50:00 CDT, Lab Collect, Print Label By Order Location  POC iStat PT/INR request: BLOOD, STAT collect, Collected, 10/27/18 16:50:56 CDT, Stop date 10/27/18 16:50:00 CDT, Lab Collect, Print Label By Order Location  Ordered (In-Lab)  Automated Diff: STAT collect, 10/27/18 16:50:00 CDT, Blood, Collected, Stop date 10/27/18 16:50:00 CDT, Lab Collect, Print Label By Order Location, 10/27/18 16:35:00 CDT  CBC w/ Auto Diff: STAT collect, 10/27/18 16:50:56 CDT, BLOOD, Collected, Stop date 10/27/18 16:50:00 CDT, Lab Collect  CMP: STAT collect, 10/27/18 16:50:56 CDT, BLOOD, Collected, Stop date 10/27/18 16:50:00 CDT, Lab Collect  INR - Protime: STAT collect, 10/27/18 16:50:56 CDT, BLOOD, Collected, Stop date 10/27/18 16:50:00 CDT, Lab Collect  PTT: STAT collect, 10/27/18 16:50:56 CDT, BLOOD, Collected, Stop date 10/27/18 16:50:00 CDT, Lab Collect  Completed  CT Head W/O Contrast: Stat, 10/27/18 16:35:00 CDT, Other (please specify), CODE FAST, None, Stretcher, Patient Has IV?, Rad Type, Schedule this test, 10/27/18 16:35:00 CDT  EKG Adult 12 Lead: 10/27/18 16:35:00 CDT, Stat, Once, 10/27/18 16:35:00 CDT, Stretcher, Patient Has IV, Standard Precautions, -1, -1, 10/27/18 16:35:00 CDT  POC Istat ER Troponin: Blood, Stat collect, Collected, 10/27/18 16:54:36 CDT  POC iSTAT PTINR: Blood, Stat collect, Collected, 10/27/18 16:50:07 CDT  Point of Care iSTAT Chem8: Blood, Stat collect, Collected, 10/27/18 16:53:11 CDT.   Electrocardiogram:  Time 10/27/2018 16:46:00, rate 87, no ectopy, EP Interp, The Axis is leftward.  , STT segments Non specific  changes, low voltage in leads III and AVF.    Results review:  Lab results : Lab View   10/27/2018 16:54 CDT     POC Troponin              0.01 ng/mL    10/27/2018 16:53 CDT     POC Sodium                139 mmol/L                             POC Potassium             4.4 mmol/L                             POC Chloride              101 mmol/L                             POC Ion Calcium           1.13 mmol/L                             POC Glucose               151 mg/dL  HI                             POC BUN                   18.0 mg/dL                             POC Creatinine            1.5 mg/dL  HI                             POC AGAP                  16.0  NA                             POC Hb                    11.6 mg/dL  LOW                             POC Hct                   34.0 %  LOW                             POC TCO2                  27.0 mmol/L    10/27/2018 16:50 CDT     Sodium Lvl                142 mmol/L                             Potassium Lvl             4.7 mmol/L                             Chloride                  106 mmol/L                             CO2                       27.0 mmol/L                             Calcium Lvl               8.7 mg/dL                             Glucose Lvl               149 mg/dL  HI                             BUN                       17.0 mg/dL                             Creatinine                1.61 mg/dL  HI                             eGFR-AA                   53 mL/min/1.73 m2  NA                             eGFR-REY                  44 mL/min/1.73 m2  NA                             Bili Total                0.3 mg/dL                             Bili Direct               0.10 mg/dL                             Bili Indirect             0.20 mg/dL                             AST                       14 unit/L  LOW                             ALT                       21 unit/L                             Alk Phos                  79 unit/L                              Total Protein             7.2 gm/dL                             Albumin Lvl               2.80 gm/dL  LOW                             Globulin                  4.40 gm/dL  HI                             A/G Ratio                 0.6  NA                             PT                        17.6 second(s)  HI                             POC PT                    18.5 second(s)  HI                             INR                       1.40  HI                             POC INR                   1.6  HI                             PTT                       45.0 second(s)  HI                             WBC                       7.9 x10(3)/mcL                             RBC                       3.62 x10(6)/mcL  LOW                             Hgb                       10.0 gm/dL  LOW                             Hct                       33.1 %  LOW                             Platelet                  246 x10(3)/mcL                             MCV                       91.4 fL                             MCH                       27.6 pg                             MCHC                      30.2 gm/dL  LOW                             RDW                       14.1 %                             MPV                       11.1 fL                             Abs Neut                  5.84 x10(3)/mcL                             Neutro Auto               74 %  NA                             Lymph Auto                16 %  NA                             Mono Auto                 9 %  NA                             Eos Auto                  0 %  NA                             Abs Eos                   0.0 x10(3)/mcL                             Basophil Auto             0 %  NA                             Abs Neutro                5.84 x10(3)/mcL                             Abs Lymph                 1.2 x10(3)/mcL                             Abs Mono                  0.7 x10(3)/mcL                              Abs Baso                  0.0 x10(3)/mcL  .   Head Computed Tomography:  Time reported 10/27/2018 16:52:00, interpretation by Radiologist,            * Final Report *    Reason For Exam  CODE FAST;Other (please specify)    Radiology Report  Clinical History:  Code fast     Technique:  Axial CT of the brain were obtained without contrast. There are  osseous reconstructed images available for review with coronal and  sagittal reconstructions.     Automatic exposure control was utilized to reduce the patient's  radiation dose.     Comparison:  September 15, 2018     Findings:  No acute intracranial hemorrhage.  Diffuse cerebral atrophy with concordant ventricular enlargement.   . Scattered hypodensities throughout the deep periventricular white  matter.   The osseous structures are normal.   The mastoid air cells are clear.   The auditory canals are patent bilaterally.  The globes and orbital contents are normal bilaterally.  Unchanged opacification of the bilateral maxillary sinuses.  Opacification of the frontal sinus.     Impression  No acute intracranial hemorrhage. Findings of microvascular ischemic  disease.   Frontal and maxillary opacification remaining.       Signature Line  Electronically Signed By: Larry MONTGOMERY, Yeyo Wilson  Date/Time Signed: 10/27/2018 16:52    .       Reexamination/ Reevaluation   Vital signs   Basic Oxygen Information   10/27/2018 16:45 CDT     SpO2                      96 %                             Oxygen Therapy            Nasal cannula                             Oxygen Flow Rate          2 L/min    10/27/2018 16:32 CDT     SpO2                      95 %     Patient's workup in the ED is unremarkable.  Patient essentially had similar presentation last month was admitted, [facility] normal MRI but had another episode while in the hospital.  Subsequent MRI still did not reveal any acute infarction patient's symptoms have resolved.  Not sure if these are TIAs or not.  The  initial weakness appears to be a global weakness, but family is identified some right facial drip in trouble with speech/swallowing intermittently when this occurs as well.  Right side was the side the patient had previous stroke deficits as well      Impression and Plan   Diagnosis   Brain TIA ([id number] G45.9)   Plan   Condition: Stable.    Disposition: Admit time  10/27/2018 19:57:00, Place in Observation Unit, Kash MONTGOMERY, Kumar BLANCO    Notes: I, Tatiana Holliday, acted solely as a scribe for and in the presence of Dr. Godfrey who performed the service. , I  personally performed all of the above services as recorded in this chart.

## 2022-05-05 NOTE — HISTORICAL [facility]ER
This is a historical note converted from [employer]. Formatting and pictures may have been removed.  Please reference Chato for original formatting and attached multimedia. Chief Complaint  Pt c/o generalized weakness since noon, daughter states she noticed a R mouth droop that he has not had in the past. Prev hx cva with tpa 2014 tia in 09/18 cbg 150  Reason for Consultation  Possible TIA  History of Present Illness  83-year-old?male with a history of multiple medical problems including?diabetes mellitus?and previous CVAs.? He was brought to the hospital yesterday after?he became?generally weak and could not stand up from his?recliner.? He has been taking a nap?and felt weak after he woke up.? His wife also noticed droopiness on the right side of his face.? He was able to communicate and his speech was clear.? There was no reports of loss of consciousness. ?No reports of?chest pain or shortness of breath.??  ?  In the emergency room, the patient was evaluated?with a CT scan of the head that showed no acute abnormalities.? He gradually recovered back to baseline. ?Today he is feeling back to his normal self nd reports no new complaints.  ?  He is on Xarelto and a baby aspirin.  ?  MRI of the brain today showed no evidence of acute ischemic changes.? It shows multiple old infarcts involving the right centrum semiovale, bilateral corona radiata, basal ganglia and the kelsey plus bilateral cerebral periventricular and subcortical white matter variable size T2-FLAIR hyperintense signals are consistent chronic microangiopathic ischemia. Involutional changes contribute to cerebellar and cerebral cortical volume loss.  ?  He has an unchanged left vertebral artery stenosis.? He is a patient of Dr. Sanchez.  ?  He is already receiving outpatient physical therapy 3 times a week.  Review of Systems  Review of systems is negative.  Physical Exam  Vitals & Measurements  T:?37.1? ?C (Oral)? TMIN:?37.1? ?C (Oral)? TMAX:?37.4? ?C (Oral)?  HR:?79(Peripheral)? RR:?24? BP:?134/68? SpO2:?93%? WT:?60.6?kg?  General:? No distress.? Well developed.  Mental Status:? Alert.? Oriented x 3.? Speech is fluent and clear.? No dysnomia.? No aphasia.? No hallucinations/delusions.  Cranial Nerves:? LATRICIA??EOM intact.? No ptosis.? Normal facial sensation.? Face is symmetric.? No facial weakness.? Hearing is grossly normal to room conversation.? Soft palate elevates equally.? Shrugs both shoulders equally.? Tongue is protruded to the midline.  Motor:? No focal atrophy.? Normal tone.? No contractions.? No involuntary movements.? Normal strength throughout.  Sensory:? Normal to light touch in all extremities.  Coordination:? Grossly normal.  Gait:? Not tested.  Assessment/Plan  Brain TIA  Episode of generalized muscle weakness and right facial droop.? Completely resolved.? MRI of the brain showed no?acute ischemic changes. ?He has a?left distal vertebral artery stenosis which is unchanged. ?No need to make changes to Xarelto and aspirin.? He can go home with continuation of outpatient physical therapy.? Discussed with the family in the room including his daughter and wife.? Follow-up with Dr. Sanchez.  ?  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   Procedure/Surgical History  72780 - LT CATARACT W/IOL 1 STA PHACO (Left) (04/27/2017)  Extracapsular cataract removal with insertion of intraocular lens prosthesis (1 stage procedure), manual or mechanical technique (eg, irrigation and aspiration or phacoemulsification) (04/27/2017)  Replacement of Left Lens with Synthetic Substitute, Percutaneous Approach (04/27/2017)  88290 - RT CATARACT W/IOL 1 STA PHACO (Right) (04/12/2017)  Extracapsular cataract removal with insertion of intraocular lens prosthesis (1 stage procedure), manual or mechanical technique (eg, irrigation and aspiration or phacoemulsification) (04/12/2017)  Replacement of Right Lens with Synthetic Substitute, Percutaneous Approach (04/12/2017)  Injection or infusion of thrombolytic  agent (12/14/2014)  Magnetic resonance imaging of brain and brain stem (10/18/2013)  Magnetic resonance imaging of other and unspecified sites (10/18/2013)  inguinal hernia repair (2003)  carpal tunnel surgery (01/01/1982)  appendectomy (01/01/1962)  tonsillectomy (1945)  Colonoscopy  left illium tumor removal  R knee surgery  ruptured intestinal ulcer repair   Medications  Inpatient  aspirin 81 mg oral Delayed Release (EC) tablet, 81 mg= 1 tab(s), Oral, Daily  Feosol, 325 mg= 1 tab(s), Oral, Daily  glipiZIDE 10 mg oral tablet, 20 mg= 2 tab(s), Oral, BIDWMeal  hydrALAZINE 20 mg/mL injectable solution, 10 mg= 0.5 mL, IV Push, q4hr, PRN  labetalol, 10 mg= 2 mL, IV Push, q10min, PRN  lisinopril 10 mg oral tablet, 20 mg= 2 tab(s), Oral, Daily  metFORMIN, 250 mg= 0.5 tab(s), Oral, BID  simvastatin 20 mg oral tablet, 20 mg= 1 tab(s), Oral, Once a day (at bedtime)  Timoptic Ocudose 0.5% ophthalmic solution, 1 drop(s), Eye-Both, BID  Trusopt 2% ophthalmic solution, 1 drop(s), Eye-Both, BID  Xarelto 20mg Tablet, 20 mg= 2 tab(s), Oral, qPM  Home  aspirin 81 mg oral Delayed Release (EC) tablet, 81 mg= 1 tab(s), Oral, Daily, 4 refills,? ?Not taking  dorzolamide-timolol 2.23%-0.68% ophthalmic solution, 1 drop(s), Eye-Both, BID  Fergon, 320 mg, Oral, Daily  ferrous sulfate 325 mg (65 mg elemental iron) oral tablet, 325 mg, Oral, BID,? ?Not taking  Fish Oil oral capsule, 1 cap(s), Oral, Daily  glipiZIDE 10 mg oral tablet, 20 mg= 2 tab(s), Oral, BIDWMeal,? ?Not taking  Mavik 4 mg oral tablet, 4 mg= 1 tab(s), Oral, Daily  metformin 500 mg oral tablet, 250 mg= 0.5 tab(s), Oral, BID  Multiple Vitamins with Minerals oral capsule, 1 cap(s), Oral, Daily  PriLOSEC OTC, 20 mg, Oral, Daily  simvastatin 20 mg oral tablet, 20 mg= 1 tab(s), Oral, Once a day (at bedtime)  timolol maleate 0.5% ophthalmic solution, 1 drop(s), Eye-Both, BID  Xarelto 20mg Tablet, 20 mg= 1 tab(s), Oral, qPM  Allergies  No Known Allergies  Social History  Alcohol -  Denies Alcohol Use, 10/17/2013  Home/Environment - No Risk, 10/21/2013  Substance Abuse - Denies Substance Abuse, 10/17/2013  Tobacco - Denies Tobacco Use, 10/17/2013  Never smoker, N/A, 10/27/2018  Family History  Diabetes mellitus type 1.: Father.  Glaucoma: Father.  Stroke: Father.  Immunizations  Vaccine Date Status Comments   pneumococcal 23-polyvalent vaccine - Not Given Patient Refuses     Diagnostic Results  ?  ?  (10/28/2018 14:52 CDT MRI Brain W W/O Contrast)  ?  Report  ?  CLINICAL: Right sided arm weakness. Bilateral leg weakness. Right facial droop.  ?  TECHNIQUE: Multiplanar MRI sequences were performed of the brain without and following the administration of gadolinium based contrast.  ?  Comparison: Previous MRI brain September 14, 2018 and CT brain October 27, 2018.  ?  FINDINGS: There are multiple old infarcts involving the right centrum semiovale, bilateral corona radiata, basal ganglia and the kelsey. Bilateral cerebral periventricular and subcortical white matter variable size T2-FLAIR hyperintense signals are consistent chronic microangiopathic ischemia. Involutional changes contribute to cerebellar and cerebral cortical volume loss. ?There is no pathologic intra-axial, leptomeningeal or dural enhancement. ?Gradient echo sequences demonstrate no evidence of de phasing artifact to suggest hemorrhagic byproducts. ?No evidence of diffusion restriction or ADC map signal drop out to suggest acute infarct. ?The sella and suprasellar areas are unremarkable. The cerebellar tonsils are normally positioned. There is no acute intracranial hemorrhage, hydrocephalus, midline shift or mass effect. ?No acute extra axial fluid collections identified.  ?  The mastoid air cells are clear. ?Paranasal sinuses disease is significant with mucosal thickening of the maxillary, ethmoidal and the frontal sinuses.  ?  IMPRESSION:  ?  1. No acute intracranial findings identified.  2. Multiple old infarcts, chronic  microangiopathic ischemia and atrophy.?  3. Sinus disease.  ?  ? [1]  ?  (10/28/2018 14:52 CDT MR Angio Head W/O Contrast)  Report  ?  CLINICAL: Possible CVA.  ?  TECHNIQUE: ?3-D time of flight MR sequences were performed through the [place] [facility] without administration of contrast.?  ?  COMPARISON: September 14, 2018.  ?  FINDINGS: ?There is unremarkable flow-related visualization of the intracranial internal carotid arteries, middle and anterior cerebral arteries and the major branches. ?There is no conclusive evidence of an aneurysmal dilatation, flow limiting stenosis or ?vascular malformation. There is unchanged narrowing of the distal left vertebral artery. Right vertebral artery is dominant with unremarkable flow. Basilar artery is unremarkable. Bilateral posterior cerebral arteries are also patent.?  ?  IMPRESSION:  ?  Unchanged stenosis of the distal left vertebral artery. Otherwise, no flow limiting stenosis identified.  ? [2]  ?  ?  (10/28/2018 14:52 CDT MRI Brain W W/O Contrast)  ?right centrum semiovale, bilateral corona radiata, basal ganglia and the kelsey. Bilateral cerebral periventricular and subcortical white matter variable size T2-FLAIR hyperintense signals are consistent chronic microangiopathic ischemia  ? [3]     [1]?MRI Brain W W/O Contrast; Huval RT, Linda M 10/28/2018 14:52 CDT  [2]?MR Angio Head W/O Contrast; Huval RT, Hunregulo M 10/28/2018 14:52 CDT  [3]?MRI Brain W W/O Contrast; Huval RT, Hunregulo  10/28/2018 14:52 CDT

## 2025-03-19 ENCOUNTER — HOSPITAL ENCOUNTER (EMERGENCY)
Facility: HOSPITAL | Age: OVER 89
Discharge: HOME | End: 2025-03-19
Attending: STUDENT IN AN ORGANIZED HEALTH CARE EDUCATION/TRAINING PROGRAM
Payer: MEDICARE

## 2025-03-19 VITALS
HEART RATE: 74 BPM | DIASTOLIC BLOOD PRESSURE: 75 MMHG | BODY MASS INDEX: 19.33 KG/M2 | SYSTOLIC BLOOD PRESSURE: 168 MMHG | WEIGHT: 135 LBS | RESPIRATION RATE: 20 BRPM | OXYGEN SATURATION: 97 % | TEMPERATURE: 98 F | HEIGHT: 70 IN

## 2025-03-19 DIAGNOSIS — M54.50 ACUTE MIDLINE LOW BACK PAIN WITHOUT SCIATICA: Primary | ICD-10-CM

## 2025-03-19 LAB
ALBUMIN SERPL-MCNC: 3.5 G/DL (ref 3.4–4.8)
ALBUMIN/GLOB SERPL: 1.3 RATIO (ref 1.1–2)
ALP SERPL-CCNC: 57 UNIT/L (ref 40–150)
ALT SERPL-CCNC: 11 UNIT/L (ref 0–55)
ANION GAP SERPL CALC-SCNC: 9 MEQ/L
AST SERPL-CCNC: 13 UNIT/L (ref 5–34)
BACTERIA #/AREA URNS AUTO: NORMAL /HPF
BASOPHILS # BLD AUTO: 0.03 X10(3)/MCL
BASOPHILS NFR BLD AUTO: 0.6 %
BILIRUB SERPL-MCNC: 0.4 MG/DL
BILIRUB UR QL STRIP.AUTO: NEGATIVE
BUN SERPL-MCNC: 28.4 MG/DL (ref 8.4–25.7)
CALCIUM SERPL-MCNC: 9.4 MG/DL (ref 8.8–10)
CHLORIDE SERPL-SCNC: 111 MMOL/L (ref 98–111)
CLARITY UR: CLEAR
CO2 SERPL-SCNC: 24 MMOL/L (ref 23–31)
COLOR UR AUTO: ABNORMAL
CREAT SERPL-MCNC: 1.88 MG/DL (ref 0.72–1.25)
CREAT/UREA NIT SERPL: 15
EOSINOPHIL # BLD AUTO: 0.23 X10(3)/MCL (ref 0–0.9)
EOSINOPHIL NFR BLD AUTO: 4.3 %
ERYTHROCYTE [DISTWIDTH] IN BLOOD BY AUTOMATED COUNT: 14 % (ref 11.5–17)
GFR SERPLBLD CREATININE-BSD FMLA CKD-EPI: 34 ML/MIN/1.73/M2
GLOBULIN SER-MCNC: 2.8 GM/DL (ref 2.4–3.5)
GLUCOSE SERPL-MCNC: 138 MG/DL (ref 75–121)
GLUCOSE UR QL STRIP: NEGATIVE
HCT VFR BLD AUTO: 38.4 % (ref 42–52)
HGB BLD-MCNC: 12.6 G/DL (ref 14–18)
HGB UR QL STRIP: ABNORMAL
IMM GRANULOCYTES # BLD AUTO: 0.02 X10(3)/MCL (ref 0–0.04)
IMM GRANULOCYTES NFR BLD AUTO: 0.4 %
KETONES UR QL STRIP: NEGATIVE
LEUKOCYTE ESTERASE UR QL STRIP: NEGATIVE
LYMPHOCYTES # BLD AUTO: 1.26 X10(3)/MCL (ref 0.6–4.6)
LYMPHOCYTES NFR BLD AUTO: 23.6 %
MCH RBC QN AUTO: 30.4 PG (ref 27–31)
MCHC RBC AUTO-ENTMCNC: 32.8 G/DL (ref 33–36)
MCV RBC AUTO: 92.8 FL (ref 80–94)
MONOCYTES # BLD AUTO: 0.51 X10(3)/MCL (ref 0.1–1.3)
MONOCYTES NFR BLD AUTO: 9.6 %
NEUTROPHILS # BLD AUTO: 3.29 X10(3)/MCL (ref 2.1–9.2)
NEUTROPHILS NFR BLD AUTO: 61.5 %
NITRITE UR QL STRIP: NEGATIVE
NRBC BLD AUTO-RTO: 0 %
PH UR STRIP: 6.5 [PH]
PLATELET # BLD AUTO: 190 X10(3)/MCL (ref 130–400)
PMV BLD AUTO: 10.5 FL (ref 7.4–10.4)
POTASSIUM SERPL-SCNC: 4.8 MMOL/L (ref 3.5–5.1)
PROT SERPL-MCNC: 6.3 GM/DL (ref 5.8–7.6)
PROT UR QL STRIP: NEGATIVE
RBC # BLD AUTO: 4.14 X10(6)/MCL (ref 4.7–6.1)
RBC #/AREA URNS AUTO: NORMAL /HPF
SODIUM SERPL-SCNC: 144 MMOL/L (ref 136–145)
SP GR UR STRIP.AUTO: 1.01 (ref 1–1.03)
SQUAMOUS #/AREA URNS AUTO: NORMAL /HPF
UROBILINOGEN UR STRIP-ACNC: 0.2
WBC # BLD AUTO: 5.34 X10(3)/MCL (ref 4.5–11.5)
WBC #/AREA URNS AUTO: NORMAL /HPF

## 2025-03-19 PROCEDURE — 63600175 PHARM REV CODE 636 W HCPCS: Performed by: STUDENT IN AN ORGANIZED HEALTH CARE EDUCATION/TRAINING PROGRAM

## 2025-03-19 PROCEDURE — 81003 URINALYSIS AUTO W/O SCOPE: CPT | Performed by: STUDENT IN AN ORGANIZED HEALTH CARE EDUCATION/TRAINING PROGRAM

## 2025-03-19 PROCEDURE — 80053 COMPREHEN METABOLIC PANEL: CPT | Performed by: STUDENT IN AN ORGANIZED HEALTH CARE EDUCATION/TRAINING PROGRAM

## 2025-03-19 PROCEDURE — 99284 EMERGENCY DEPT VISIT MOD MDM: CPT | Mod: 25

## 2025-03-19 PROCEDURE — 25000003 PHARM REV CODE 250: Performed by: STUDENT IN AN ORGANIZED HEALTH CARE EDUCATION/TRAINING PROGRAM

## 2025-03-19 PROCEDURE — 85025 COMPLETE CBC W/AUTO DIFF WBC: CPT | Performed by: STUDENT IN AN ORGANIZED HEALTH CARE EDUCATION/TRAINING PROGRAM

## 2025-03-19 PROCEDURE — 96372 THER/PROPH/DIAG INJ SC/IM: CPT | Performed by: STUDENT IN AN ORGANIZED HEALTH CARE EDUCATION/TRAINING PROGRAM

## 2025-03-19 RX ORDER — TIMOLOL MALEATE 5 MG/ML
1 SOLUTION/ DROPS OPHTHALMIC 2 TIMES DAILY
COMMUNITY
Start: 2025-01-16

## 2025-03-19 RX ORDER — TRANDOLAPRIL 4 MG/1
4 TABLET ORAL
COMMUNITY
Start: 2024-12-24

## 2025-03-19 RX ORDER — METFORMIN HYDROCHLORIDE 500 MG/1
500 TABLET ORAL 2 TIMES DAILY
COMMUNITY
Start: 2025-02-27

## 2025-03-19 RX ORDER — DEXAMETHASONE SODIUM PHOSPHATE 4 MG/ML
8 INJECTION, SOLUTION INTRA-ARTICULAR; INTRALESIONAL; INTRAMUSCULAR; INTRAVENOUS; SOFT TISSUE
Status: COMPLETED | OUTPATIENT
Start: 2025-03-19 | End: 2025-03-19

## 2025-03-19 RX ORDER — HYDROCODONE BITARTRATE AND ACETAMINOPHEN 5; 325 MG/1; MG/1
1 TABLET ORAL
Refills: 0 | Status: COMPLETED | OUTPATIENT
Start: 2025-03-19 | End: 2025-03-19

## 2025-03-19 RX ORDER — SIMVASTATIN 20 MG/1
20 TABLET, FILM COATED ORAL NIGHTLY
COMMUNITY
Start: 2024-12-24

## 2025-03-19 RX ORDER — OXYBUTYNIN CHLORIDE 5 MG/1
5 TABLET, EXTENDED RELEASE ORAL
COMMUNITY
Start: 2025-01-17

## 2025-03-19 RX ORDER — GLIPIZIDE 2.5 MG/1
2.5 TABLET, EXTENDED RELEASE ORAL 2 TIMES DAILY
COMMUNITY
Start: 2024-11-21

## 2025-03-19 RX ADMIN — HYDROCODONE BITARTRATE AND ACETAMINOPHEN 1 TABLET: 5; 325 TABLET ORAL at 11:03

## 2025-03-19 RX ADMIN — DEXAMETHASONE SODIUM PHOSPHATE 8 MG: 4 INJECTION, SOLUTION INTRA-ARTICULAR; INTRALESIONAL; INTRAMUSCULAR; INTRAVENOUS; SOFT TISSUE at 11:03

## 2025-03-19 NOTE — ED PROVIDER NOTES
Encounter Date: 3/19/2025       History     Chief Complaint   Patient presents with    Back Pain     Back pain 10/10 onset last night. Pt states he got off of his couch wrong, thinks he pulled a muscle. Denies trauma, denies loc. Pt on xarelto due to previous strokes. Pt also states burning upon urination.     HPI    89-year-old male with a past medical history of TIAs on Xarelto, type 2 diabetes, hyperlipidemia and overactive bladder presents emergency department for back pain.  Patient states that he twisted getting off the couch last night and thinks he pulled a muscle.  States he is unable to sit up because of the pain.  States it is 10/10 pain to the bones of his low back.  Denies any abdominal pain.  No fall or trauma.  No numbness or weakness to the legs.  Patient also states he started having some burning on urination this morning    Review of patient's allergies indicates:  No Known Allergies  History reviewed. No pertinent past medical history.  History reviewed. No pertinent surgical history.  No family history on file.  Social History[1]  Review of Systems   Constitutional:  Negative for fever.   Respiratory:  Negative for cough.    Cardiovascular:  Negative for chest pain.   Gastrointestinal:  Negative for abdominal pain, constipation, diarrhea, nausea and vomiting.   Genitourinary:  Positive for dysuria. Negative for flank pain.   Musculoskeletal:  Positive for back pain.   Neurological:  Negative for headaches.   All other systems reviewed and are negative.      Physical Exam     Initial Vitals [03/19/25 1037]   BP Pulse Resp Temp SpO2   (!) 165/72 80 20 98.2 °F (36.8 °C) 95 %      MAP       --         Physical Exam    Nursing note and vitals reviewed.  Constitutional: He appears well-developed and well-nourished. No distress.   Cardiovascular:  Normal rate and regular rhythm.           Pulmonary/Chest: Breath sounds normal. No respiratory distress.   Abdominal: Abdomen is soft. He exhibits no mass.  There is no abdominal tenderness. There is no rebound and no guarding.   Musculoskeletal:         General: Tenderness (tenderness around L4 with bony point tenderness) present. Normal range of motion.     Neurological: He is alert and oriented to person, place, and time.   Skin: Skin is warm. Capillary refill takes less than 2 seconds.         ED Course   Procedures  Labs Reviewed   URINALYSIS, REFLEX TO URINE CULTURE - Abnormal       Result Value    Color, UA Straw      Appearance, UA Clear      Specific Gravity, UA 1.010      pH, UA 6.5      Protein, UA Negative      Glucose, UA Negative      Ketones, UA Negative      Blood, UA Small (*)     Bilirubin, UA Negative      Urobilinogen, UA 0.2      Nitrites, UA Negative      Leukocyte Esterase, UA Negative     COMPREHENSIVE METABOLIC PANEL - Abnormal    Sodium 144      Potassium 4.8      Chloride 111      CO2 24      Glucose 138 (*)     Blood Urea Nitrogen 28.4 (*)     Creatinine 1.88 (*)     Calcium 9.4      Protein Total 6.3      Albumin 3.5      Globulin 2.8      Albumin/Globulin Ratio 1.3      Bilirubin Total 0.4      ALP 57      ALT 11      AST 13      eGFR 34      Anion Gap 9.0      BUN/Creatinine Ratio 15     CBC WITH DIFFERENTIAL - Abnormal    WBC 5.34      RBC 4.14 (*)     Hgb 12.6 (*)     Hct 38.4 (*)     MCV 92.8      MCH 30.4      MCHC 32.8 (*)     RDW 14.0      Platelet 190      MPV 10.5 (*)     Neut % 61.5      Lymph % 23.6      Mono % 9.6      Eos % 4.3      Basophil % 0.6      Imm Grans % 0.4      Neut # 3.29      Lymph # 1.26      Mono # 0.51      Eos # 0.23      Baso # 0.03      Imm Gran # 0.02      NRBC% 0.0     URINALYSIS, MICROSCOPIC - Normal    Bacteria, UA None Seen      RBC, UA 3-5      WBC, UA None Seen      Squamous Epithelial Cells, UA None Seen     CBC W/ AUTO DIFFERENTIAL    Narrative:     The following orders were created for panel order CBC auto differential.  Procedure                               Abnormality         Status                      ---------                               -----------         ------                     CBC with Differential[[id number]]        Abnormal            Final result                 Please view results for these tests on the individual orders.          Imaging Results              CT Lumbar Spine Without Contrast (Final result)  Result time 03/19/25 11:17:31      Final result by Leni Gong MD (03/19/25 11:17:31)                   Impression:      1. No acute fracture identified.  2. Multilevel degenerative changes of the lumbar spine.      Electronically signed by: Leni Gong  Date:    03/19/2025  Time:    11:17               Narrative:    EXAMINATION:  CT LUMBAR SPINE WITHOUT CONTRAST    CLINICAL HISTORY:  Low back pain, no red flags, no prior management;    TECHNIQUE:  Noncontrast CT images of the lumbar spine. Axial, coronal, and sagittal reformatted images were obtained. Dose length product is 438 mGycm. Automatic exposure control, adjustment of mA/kV or iterative reconstruction technique was used to limit radiation dose.    COMPARISON:  None    FINDINGS:  There are 5 non remaining lumbar type vertebral bodies.  There is a rightward curvature of the lumbar spine.  There is a chronic L3 superior endplate compression deformity with approximately 50% loss of height centrally.  There is no bony retropulsion.  The remaining vertebral body heights are preserved.  There is no acute fracture identified.  There are multilevel degenerative changes disc height loss, marginal osteophyte formation and facet arthropathy.  There is mild to moderate degenerative canal narrowing at L2-L3 and L3-L4.  There are mild multilevel neural foraminal stenoses.  There is no paraspinal edema.                                       Medications   HYDROcodone-acetaminophen 5-325 mg per tablet 1 tablet (1 tablet Oral Given 3/19/25 1110)   dexAMETHasone injection 8 mg (8 mg Intramuscular Given 3/19/25 1154)     Medical Decision  Making  Initial Assessment:       Back pain      Differential Diagnosis:   Judging by the patient's chief complaint and pertinent history, the patient has the following possible differential diagnoses, including but not limited to the following.  Some of these are deemed to be lower likelihood and some more likely based on my physical exam and history combined with possible lab work and/or imaging studies.   Please see the pertinent studies, and refer to the HPI.  Some of these diagnoses will take further evaluation to fully rule out, perhaps as an outpatient and the patient was encouraged to follow up when discharged for more comprehensive evaluation.      Muscle spasm, compression fracture, AAA, UTI, pyelonephritis,  as well as multiple other possible etiologies      Problems Addressed:  Acute midline low back pain without sciatica: acute illness or injury    Amount and/or Complexity of Data Reviewed  Labs: ordered. Decision-making details documented in ED Course.  Radiology: ordered.    Risk  Prescription drug management.               ED Course as of 03/19/25 1203   Wed Mar 19, 2025   1119 WBC: 5.34 [BS]   1119 Hemoglobin(!): 12.6 [BS]   1119 Hematocrit(!): 38.4 [BS]   1119 Platelet Count: 190 [BS]   1139 Sodium: 144 [BS]   1139 Potassium: 4.8 [BS]   1139 Chloride: 111 [BS]   1139 CO2: 24 [BS]   1139 BUN(!): 28.4 [BS]   1139 Creatinine(!): 1.88 [BS]   1139 Glucose(!): 138 [BS]   1155 Leukocyte Esterase, UA: Negative [BS]   1155 NITRITE UA: Negative [BS]   1202 WBC, UA: None Seen [BS]   1202 RBC, UA: 3-5 [BS]   1202 Bacteria, UA: None Seen [BS]   1203 Pain improved.  No signs of UTI.  Blood work baseline for patient.  Mild signs of dehydration.  Informed patient that drank some warm water.  Tylenol as needed for pain.  Stretching.  No opiates because of age.  Patient and family agree [BS]      ED Course User Index  [BS] Seth Burciaga MD                           Clinical Impression:  Final  diagnoses:  [M54.50] Acute midline low back pain without sciatica (Primary)          ED Disposition Condition    Discharge Stable          ED Prescriptions    None       Follow-up Information       Follow up With Specialties Details Why Contact Info    Axel Hewitt, FNP Internal Medicine Schedule an appointment as soon as possible for a visit   [address] [place] [address]  [phone]      [facility] - Emergency Dept Emergency Medicine Go to  If symptoms worsen [address]  [facility] [id number] [phone]               [1]   Social History  Tobacco Use    Smoking status: Never    Smokeless tobacco: Never   Substance Use Topics    Alcohol use: Not Currently    Drug use: Never        Seth Burciaga MD  03/19/25 1201

## 2025-04-17 ENCOUNTER — HOSPITAL ENCOUNTER (EMERGENCY)
Facility: HOSPITAL | Age: OVER 89
Discharge: HOME | End: 2025-04-17
Attending: EMERGENCY MEDICINE
Payer: MEDICARE

## 2025-04-17 VITALS
WEIGHT: 150 LBS | BODY MASS INDEX: 21.47 KG/M2 | SYSTOLIC BLOOD PRESSURE: 125 MMHG | TEMPERATURE: 98 F | OXYGEN SATURATION: 97 % | DIASTOLIC BLOOD PRESSURE: 75 MMHG | HEIGHT: 70 IN | RESPIRATION RATE: 16 BRPM | HEART RATE: 88 BPM

## 2025-04-17 DIAGNOSIS — R41.82 ALTERED MENTAL STATUS: ICD-10-CM

## 2025-04-17 DIAGNOSIS — S32.010A CLOSED COMPRESSION FRACTURE OF BODY OF L1 VERTEBRA: Primary | ICD-10-CM

## 2025-04-17 DIAGNOSIS — E86.0 DEHYDRATION: ICD-10-CM

## 2025-04-17 LAB
ALBUMIN SERPL-MCNC: 3 G/DL (ref 3.4–4.8)
ALBUMIN/GLOB SERPL: 0.9 RATIO (ref 1.1–2)
ALP SERPL-CCNC: 95 UNIT/L (ref 40–150)
ALT SERPL-CCNC: 9 UNIT/L (ref 0–55)
ANION GAP SERPL CALC-SCNC: 10 MEQ/L
ANION GAP SERPL CALC-SCNC: 7 MEQ/L
APTT PPP: 35 SECONDS (ref 23.4–33.9)
AST SERPL-CCNC: 10 UNIT/L (ref 11–45)
BACTERIA #/AREA URNS AUTO: ABNORMAL /HPF
BASOPHILS # BLD AUTO: 0.03 X10(3)/MCL
BASOPHILS NFR BLD AUTO: 0.4 %
BILIRUB SERPL-MCNC: 0.3 MG/DL
BILIRUB UR QL STRIP.AUTO: NEGATIVE
BUN SERPL-MCNC: 35.9 MG/DL (ref 8.4–25.7)
BUN SERPL-MCNC: 39.8 MG/DL (ref 8.4–25.7)
CALCIUM SERPL-MCNC: 9 MG/DL (ref 8.8–10)
CALCIUM SERPL-MCNC: 9.5 MG/DL (ref 8.8–10)
CHLORIDE SERPL-SCNC: 106 MMOL/L (ref 98–111)
CHLORIDE SERPL-SCNC: 110 MMOL/L (ref 98–111)
CLARITY UR: CLEAR
CO2 SERPL-SCNC: 23 MMOL/L (ref 23–31)
CO2 SERPL-SCNC: 25 MMOL/L (ref 23–31)
COLOR UR AUTO: ABNORMAL
CREAT SERPL-MCNC: 1.99 MG/DL (ref 0.72–1.25)
CREAT SERPL-MCNC: 2.32 MG/DL (ref 0.72–1.25)
CREAT/UREA NIT SERPL: 17
CREAT/UREA NIT SERPL: 18
EOSINOPHIL # BLD AUTO: 0.13 X10(3)/MCL (ref 0–0.9)
EOSINOPHIL NFR BLD AUTO: 1.7 %
ERYTHROCYTE [DISTWIDTH] IN BLOOD BY AUTOMATED COUNT: 14.3 % (ref 11.5–17)
FLUAV AG UPPER RESP QL IA.RAPID: NOT DETECTED
FLUBV AG UPPER RESP QL IA.RAPID: NOT DETECTED
GFR SERPLBLD CREATININE-BSD FMLA CKD-EPI: 26 ML/MIN/1.73/M2
GFR SERPLBLD CREATININE-BSD FMLA CKD-EPI: 31 ML/MIN/1.73/M2
GLOBULIN SER-MCNC: 3.2 GM/DL (ref 2.4–3.5)
GLUCOSE SERPL-MCNC: 126 MG/DL (ref 75–121)
GLUCOSE SERPL-MCNC: 295 MG/DL (ref 75–121)
GLUCOSE UR QL STRIP: NEGATIVE
HCT VFR BLD AUTO: 36 % (ref 42–52)
HGB BLD-MCNC: 11.7 G/DL (ref 14–18)
HGB UR QL STRIP: ABNORMAL
IMM GRANULOCYTES # BLD AUTO: 0.05 X10(3)/MCL (ref 0–0.04)
IMM GRANULOCYTES NFR BLD AUTO: 0.7 %
INR PPP: 1.1 (ref 2–3)
KETONES UR QL STRIP: ABNORMAL
LACTATE SERPL-SCNC: 1.4 MMOL/L (ref 0.5–2.2)
LEUKOCYTE ESTERASE UR QL STRIP: NEGATIVE
LYMPHOCYTES # BLD AUTO: 0.79 X10(3)/MCL (ref 0.6–4.6)
LYMPHOCYTES NFR BLD AUTO: 10.6 %
MCH RBC QN AUTO: 30.5 PG (ref 27–31)
MCHC RBC AUTO-ENTMCNC: 32.5 G/DL (ref 33–36)
MCV RBC AUTO: 94 FL (ref 80–94)
MONOCYTES # BLD AUTO: 0.5 X10(3)/MCL (ref 0.1–1.3)
MONOCYTES NFR BLD AUTO: 6.7 %
NEUTROPHILS # BLD AUTO: 5.98 X10(3)/MCL (ref 2.1–9.2)
NEUTROPHILS NFR BLD AUTO: 79.9 %
NITRITE UR QL STRIP: NEGATIVE
NRBC BLD AUTO-RTO: 0 %
OHS QRS DURATION: 94 MS
OHS QTC CALCULATION: 436 MS
PH UR STRIP: 6 [PH]
PLATELET # BLD AUTO: 212 X10(3)/MCL (ref 130–400)
PMV BLD AUTO: 10.7 FL (ref 7.4–10.4)
POCT GLUCOSE: 330 MG/DL (ref 70–110)
POTASSIUM SERPL-SCNC: 5 MMOL/L (ref 3.5–5.1)
POTASSIUM SERPL-SCNC: 5.1 MMOL/L (ref 3.5–5.1)
PROT SERPL-MCNC: 6.2 GM/DL (ref 5.8–7.6)
PROT UR QL STRIP: ABNORMAL
PROTHROMBIN TIME: 14.8 SECONDS (ref 11.7–14.5)
RBC # BLD AUTO: 3.83 X10(6)/MCL (ref 4.7–6.1)
RBC #/AREA URNS AUTO: ABNORMAL /HPF
RSV A 5' UTR RNA NPH QL NAA+PROBE: NOT DETECTED
SARS-COV-2 RNA RESP QL NAA+PROBE: NOT DETECTED
SODIUM SERPL-SCNC: 139 MMOL/L (ref 136–145)
SODIUM SERPL-SCNC: 142 MMOL/L (ref 136–145)
SP GR UR STRIP.AUTO: 1.01 (ref 1–1.03)
SQUAMOUS #/AREA URNS AUTO: ABNORMAL /HPF
TROPONIN I SERPL-MCNC: 0.04 NG/ML (ref 0–0.04)
UROBILINOGEN UR STRIP-ACNC: 0.2
WBC # BLD AUTO: 7.48 X10(3)/MCL (ref 4.5–11.5)
WBC #/AREA URNS AUTO: ABNORMAL /HPF

## 2025-04-17 PROCEDURE — 85025 COMPLETE CBC W/AUTO DIFF WBC: CPT | Performed by: EMERGENCY MEDICINE

## 2025-04-17 PROCEDURE — 0241U COVID/RSV/FLU A&B PCR: CPT | Performed by: EMERGENCY MEDICINE

## 2025-04-17 PROCEDURE — 25000003 PHARM REV CODE 250: Performed by: EMERGENCY MEDICINE

## 2025-04-17 PROCEDURE — 85730 THROMBOPLASTIN TIME PARTIAL: CPT | Performed by: EMERGENCY MEDICINE

## 2025-04-17 PROCEDURE — 96361 HYDRATE IV INFUSION ADD-ON: CPT

## 2025-04-17 PROCEDURE — 84484 ASSAY OF TROPONIN QUANT: CPT | Performed by: EMERGENCY MEDICINE

## 2025-04-17 PROCEDURE — 83605 ASSAY OF LACTIC ACID: CPT | Performed by: EMERGENCY MEDICINE

## 2025-04-17 PROCEDURE — 96374 THER/PROPH/DIAG INJ IV PUSH: CPT

## 2025-04-17 PROCEDURE — 99285 EMERGENCY DEPT VISIT HI MDM: CPT | Mod: 25

## 2025-04-17 PROCEDURE — 93010 ELECTROCARDIOGRAM REPORT: CPT | Mod: ,,, | Performed by: INTERNAL MEDICINE

## 2025-04-17 PROCEDURE — 85610 PROTHROMBIN TIME: CPT | Performed by: EMERGENCY MEDICINE

## 2025-04-17 PROCEDURE — 63600175 PHARM REV CODE 636 W HCPCS: Performed by: EMERGENCY MEDICINE

## 2025-04-17 PROCEDURE — 93005 ELECTROCARDIOGRAM TRACING: CPT

## 2025-04-17 PROCEDURE — 80053 COMPREHEN METABOLIC PANEL: CPT | Performed by: EMERGENCY MEDICINE

## 2025-04-17 PROCEDURE — 81015 MICROSCOPIC EXAM OF URINE: CPT | Performed by: EMERGENCY MEDICINE

## 2025-04-17 PROCEDURE — 96375 TX/PRO/DX INJ NEW DRUG ADDON: CPT

## 2025-04-17 RX ORDER — OMEPRAZOLE 20 MG/1
20 TABLET, DELAYED RELEASE ORAL DAILY
COMMUNITY

## 2025-04-17 RX ORDER — MORPHINE SULFATE 4 MG/ML
2 INJECTION, SOLUTION INTRAMUSCULAR; INTRAVENOUS
Refills: 0 | Status: COMPLETED | OUTPATIENT
Start: 2025-04-17 | End: 2025-04-17

## 2025-04-17 RX ORDER — GABAPENTIN 100 MG/1
100 CAPSULE ORAL 3 TIMES DAILY
COMMUNITY
Start: 2025-04-15

## 2025-04-17 RX ORDER — SODIUM CHLORIDE 9 MG/ML
1000 INJECTION, SOLUTION INTRAVENOUS
Status: COMPLETED | OUTPATIENT
Start: 2025-04-17 | End: 2025-04-17

## 2025-04-17 RX ORDER — HYDROCODONE BITARTRATE AND ACETAMINOPHEN 5; 325 MG/1; MG/1
1 TABLET ORAL EVERY 6 HOURS PRN
Qty: 20 TABLET | Refills: 0 | Status: SHIPPED | OUTPATIENT
Start: 2025-04-17

## 2025-04-17 RX ORDER — NAPROXEN SODIUM 220 MG/1
81 TABLET, FILM COATED ORAL DAILY
COMMUNITY
Start: 2024-08-23

## 2025-04-17 RX ORDER — ONDANSETRON HYDROCHLORIDE 2 MG/ML
4 INJECTION, SOLUTION INTRAVENOUS
Status: COMPLETED | OUTPATIENT
Start: 2025-04-17 | End: 2025-04-17

## 2025-04-17 RX ORDER — HYDROCODONE BITARTRATE AND ACETAMINOPHEN 5; 325 MG/1; MG/1
1 TABLET ORAL
Refills: 0 | Status: COMPLETED | OUTPATIENT
Start: 2025-04-17 | End: 2025-04-17

## 2025-04-17 RX ADMIN — MORPHINE SULFATE 2 MG: 4 INJECTION INTRAVENOUS at 02:04

## 2025-04-17 RX ADMIN — SODIUM CHLORIDE 1000 ML: 9 INJECTION, SOLUTION INTRAVENOUS at 12:04

## 2025-04-17 RX ADMIN — ONDANSETRON 4 MG: 2 INJECTION INTRAMUSCULAR; INTRAVENOUS at 02:04

## 2025-04-17 RX ADMIN — HYDROCODONE BITARTRATE AND ACETAMINOPHEN 1 TABLET: 5; 325 TABLET ORAL at 04:04

## 2025-04-17 NOTE — ED PROVIDER NOTES
Encounter Date: 4/17/2025       History     Chief Complaint   Patient presents with    Altered Mental Status     AMS x 2 weeks. Also reports flank pain. Family concerned about UTI. GCS 14, baseline 15.      Patient is a 89-year-old male presenting with worsening low back pain and increased but mild confusion.  Patient was seen in mid March after a fall.  He had imaging done at that time and there was no evidence of a fracture.  He was sent home on medications.  After couple of days the pain seemed to worsen.  He was started on Ultram and gabapentin.  Family believes he had a 2nd fall approximately 6 days after the 1st.  His neighbor helped him up but he wasn't seen then.  And since then he has continued to have pain in his lower back.  No fevers.  No headache.  No head injury reported.  He denies any neck pain.  Some decreased p.o. intake.  No numbness tingling.  No reported incontinence.  Pain is best localized to the lower back and is severe.  No pain with urination.  No skin changes.  No chest pain or shortness breath. Patient is not confused today. Family thinks he just seems not quite himself. They think it is the pain moreso than true confusion.         Review of patient's allergies indicates:  No Known Allergies  Past Medical History:   Diagnosis Date    Stroke 10/17/2013    Stroke 12/14/2014    TIA (transient ischemic attack) 09/13/2018    TIA (transient ischemic attack) 10/27/2018     Past Surgical History:   Procedure Laterality Date    ADENOIDECTOMY      APPENDECTOMY      CARPAL TUNNEL RELEASE      TONSILLECTOMY       No family history on file.  Social History[1]  Review of Systems   All other systems reviewed and are negative.      Physical Exam     Initial Vitals   BP Pulse Resp Temp SpO2   04/17/25 1133 04/17/25 1132 04/17/25 1133 04/17/25 1143 04/17/25 1133   123/64 102 15 98 °F (36.7 °C) 96 %      MAP       --                Physical Exam    Nursing note and vitals reviewed.  Constitutional: He  appears well-developed and well-nourished. He is not diaphoretic. No distress.   HENT:   Head: Normocephalic and atraumatic.   Eyes: Conjunctivae are normal. Pupils are equal, round, and reactive to light.   Neck: Neck supple.   Cardiovascular:  Normal rate, regular rhythm and normal heart sounds.           Pulmonary/Chest: Breath sounds normal. No respiratory distress. He has no wheezes. He has no rhonchi.   Abdominal: Abdomen is soft. Bowel sounds are normal. He exhibits no distension. There is no abdominal tenderness. There is no rebound and no guarding.   Musculoskeletal:         General: No edema. Normal range of motion.      Cervical back: Neck supple.      Comments: + TTP of the lumbar spine midline.     Neurological: He is alert and oriented to person, place, and time. He has normal strength. No sensory deficit. GCS score is 15. GCS eye subscore is 4. GCS verbal subscore is 5. GCS motor subscore is 6.   Skin: Skin is warm and dry.   Psychiatric: He has a normal mood and affect. Thought content normal.         ED Course   Procedures  Labs Reviewed   COMPREHENSIVE METABOLIC PANEL - Abnormal       Result Value    Sodium 139      Potassium 5.0      Chloride 106      CO2 23      Glucose 295 (*)     Blood Urea Nitrogen 39.8 (*)     Creatinine 2.32 (*)     Calcium 9.5      Protein Total 6.2      Albumin 3.0 (*)     Globulin 3.2      Albumin/Globulin Ratio 0.9 (*)     Bilirubin Total 0.3      ALP 95      ALT 9      AST 10 (*)     eGFR 26      Anion Gap 10.0      BUN/Creatinine Ratio 17     URINALYSIS, REFLEX TO URINE CULTURE - Abnormal    Color, UA Straw      Appearance, UA Clear      Specific Gravity, UA 1.015      pH, UA 6.0      Protein, UA Trace (*)     Glucose, UA Negative      Ketones, UA Trace (*)     Blood, UA Moderate (*)     Bilirubin, UA Negative      Urobilinogen, UA 0.2      Nitrites, UA Negative      Leukocyte Esterase, UA Negative     CBC WITH DIFFERENTIAL - Abnormal    WBC 7.48      RBC 3.83 (*)      Hgb 11.7 (*)     Hct 36.0 (*)     MCV 94.0      MCH 30.5      MCHC 32.5 (*)     RDW 14.3      Platelet 212      MPV 10.7 (*)     Neut % 79.9      Lymph % 10.6      Mono % 6.7      Eos % 1.7      Basophil % 0.4      Imm Grans % 0.7      Neut # 5.98      Lymph # 0.79      Mono # 0.50      Eos # 0.13      Baso # 0.03      Imm Gran # 0.05 (*)     NRBC% 0.0     PROTIME-INR - Abnormal    PT 14.8 (*)     INR 1.1 (*)     Narrative:     Protimes are used to monitor anticoagulant agents such as warfarin. PT INR values are based on the current patient normal mean and the JAMA value for the specific instrument reagent used.  **Routine theraputic target values for the INR are 2.0-3.0**   APTT - Abnormal    PTT 35.0 (*)    URINALYSIS, MICROSCOPIC - Abnormal    Bacteria, UA None Seen      RBC, UA 11-20 (*)     WBC, UA None Seen      Squamous Epithelial Cells, UA None Seen     BASIC METABOLIC PANEL - Abnormal    Sodium 142      Potassium 5.1      Chloride 110      CO2 25      Glucose 126 (*)     Blood Urea Nitrogen 35.9 (*)     Creatinine 1.99 (*)     BUN/Creatinine Ratio 18      Calcium 9.0      Anion Gap 7.0      eGFR 31     POCT GLUCOSE - Abnormal    POCT Glucose 330 (*)    LACTIC ACID, PLASMA - Normal    Lactic Acid Level 1.4     COVID/RSV/FLU A&B PCR - Normal    Influenza A PCR Not Detected      Influenza B PCR Not Detected      Respiratory Syncytial Virus PCR Not Detected      SARS-CoV-2 PCR Not Detected      Narrative:     The Xpert Xpress SARS-CoV-2/FLU/RSV plus is a rapid, multiplexed real-time PCR test intended for the simultaneous qualitative detection and differentiation of SARS-CoV-2, Influenza A, Influenza B, and respiratory syncytial virus (RSV) viral RNA in either nasopharyngeal swab or nasal swab specimens.         TROPONIN I - Normal    Troponin-I 0.037     CBC W/ AUTO DIFFERENTIAL    Narrative:     The following orders were created for panel order CBC auto differential.  Procedure                                Abnormality         Status                     ---------                               -----------         ------                     CBC with Differential[[id number]]       Abnormal            Final result                 Please view results for these tests on the individual orders.     EKG Readings: (Independently Interpreted)   EKG Interpretation 12:14 PM    Rate: 99  Rhythm: NSR  QRS: WNL  Qtc: WNL  No signs of ischemia  Nonspecific T wave abnormalities present    No priors for comparison     ECG Results              EKG 12-lead (Final result)        Collection Time Result Time QRS Duration OHS QTC Calculation    04/17/25 11:58:43 04/17/25 15:29:10 94 436                     Final result by Interface, [facility] (04/17/25 15:29:12)                   Narrative:    Test Reason : R41.82,    Vent. Rate :  99 BPM     Atrial Rate :  99 BPM     P-R Int : 160 ms          QRS Dur :  94 ms      QT Int : 340 ms       P-R-T Axes :  64 -66  89 degrees    QTcB Int : 436 ms    Normal sinus rhythm  Left axis deviation  Nonspecific T wave abnormality  Abnormal ECG  No previous ECGs available  Confirmed by Jonatan Joseph ([id number]) on 4/17/2025 3:29:06 PM    Referred By: AAAREFERRAL SELF           Confirmed By: Jonatan Joseph                                  Imaging Results              MRI Lumbar Spine Without Contrast (Final result)  Result time 04/17/25 15:54:46      Final result by Leni Gong MD (04/17/25 15:54:46)                   Impression:      1. Acute L1 compression fracture with mild loss of height, minimal bony retropulsion and mild narrowing the spinal canal.  2. Mild degenerative narrowing of the spinal canal at L1-L4.  3. Multilevel neural foraminal stenoses as described.      Electronically signed by: Leni Gong  Date:    04/17/2025  Time:    15:54               Narrative:    EXAMINATION:  MRI LUMBAR SPINE WITHOUT CONTRAST    CLINICAL HISTORY:  Compression fracture, lumbar;    TECHNIQUE:  Multiplanar  multisequence MR images of the lumbar spine are obtained without contrast.    COMPARISON:  CT lumbar spine dated 04/17/2025    FINDINGS:  There are 5 non-rib-bearing lumbar type vertebral bodies.  There is a mild rightward curvature of the lumbar spine with minimal retrolisthesis of L2 over L3.  There is an L1 vertebral body compression fracture with moderate loss of height centrally.  There is approximately 3 mm of bony retropulsion with mild narrowing of the spinal canal.  There is a chronic L3 compression deformity with mild loss of height.  The remaining vertebral body heights are preserved.    The conus terminates at the level of L1.  It is normal in signal and contour.  There is no epidural fluid collection.    Disc spaces, spinal canal and neural foramina are as follows:    L1-L2: Disc bulge and facet hypertrophy with mild narrowing of the spinal canal.  Mild bilateral neural foraminal stenosis.    L2-L3: Grade 1 retrolisthesis of L2 over L3 with posterior marginal osteophytes, facet hypertrophy and mild narrowing of the spinal canal.  Mild right and moderate left neural foraminal stenosis.    L3-L4: Disc bulge and facet hypertrophy with mild narrowing of the spinal canal.  Mild bilateral neural foraminal stenosis.    L4-L5: Disc bulge and facet hypertrophy with minimal narrowing of the spinal canal.  Mild bilateral neural foraminal stenosis.    L5-S1: No disc herniation.  Bilateral facet hypertrophy.  No significant spinal canal or neural foraminal stenosis.    No significant abnormality within the visualized paraspinous musculature.                                       CT Lumbar Spine Without Contrast (Final result)  Result time 04/17/25 13:28:35      Final result by Leni Gong MD (04/17/25 13:28:35)                   Impression:      New L1 compression fracture with moderate loss of height, minimal bony retropulsion and mild narrowing of the canal.      Electronically signed by: Leni  Beltran  Date:    04/17/2025  Time:    13:28               Narrative:    EXAMINATION:  CT LUMBAR SPINE WITHOUT CONTRAST    CLINICAL HISTORY:  fall, worsening pain and repeat fall sincce last CT;    TECHNIQUE:  Noncontrast CT images of the lumbar spine. Axial, coronal, and sagittal reformatted images were obtained. Dose length product is 346 mGycm. Automatic exposure control, adjustment of mA/kV or iterative reconstruction technique was used to limit radiation dose.    COMPARISON:  CT lumbar spine dated 03/19/2025    FINDINGS:  There are 5 non-rib-bearing lumbar type vertebral bodies.  There is a rightward curvature of the lumbar spine.  There is a new L1 compression fracture with approximate 50% loss of height centrally.  There is 4 mm of bony retropulsion with mild narrowing of the canal.  There is a stable L3 compression deformity with moderate loss of height.  The remaining vertebral body heights are preserved.  There mild degenerative change with disc height loss, marginal osteophyte formation and facet arthropathy.  There is mild prevertebral soft tissue swelling at L1.                                       CT Head Without Contrast (Final result)  Result time 04/17/25 13:25:14      Final result by Leni Gong MD (04/17/25 13:25:14)                   Impression:      1. No acute intracranial abnormality.  2. Chronic microvascular ischemic changes.      Electronically signed by: Leni Gong  Date:    04/17/2025  Time:    13:25               Narrative:    EXAMINATION:  CT HEAD WITHOUT CONTRAST    CLINICAL HISTORY:  Mental status change, unknown cause;    TECHNIQUE:  Axial scans were obtained from skull base to the vertex.    Coronal and sagittal reconstructions obtained from the axial data.    Automatic exposure control was utilized to limit radiation dose.    Contrast: None    Radiation Dose:    Total DLP: 878 mGy*cm    COMPARISON:  CT head dated 10/27/2018    FINDINGS:  There is no acute  intracranial hemorrhage or edema. The gray-white matter differentiation is preserved.  Patchy hypodensities in the subcortical and periventricular white matter, basal ganglia and thalami likely represent chronic microvascular ischemic changes.  There is encephalomalacia in the bilateral cerebellar hemispheres.    There is no mass effect or midline shift.  There is diffuse parenchymal volume loss.  The basal cisterns are patent. There is no abnormal extra-axial fluid collection.  Carotid and vertebral artery calcifications are noted.    The calvarium and skull base are intact.  There is near complete opacification of the right maxillary sinus.                                       X-Ray Chest 1 View (Final result)  Result time 04/17/25 13:05:32      Wet Read by Altagracia Bedoya MD (04/17/25 13:05:32, [facility] - Emergency Dept, Emergency Medicine)    No acute findings. When compared to prior, no significant change                      Final result by Ajit Abarca MD (04/17/25 12:14:54)                   Impression:      No acute chest disease is identified.      Electronically signed by: Ajit Abarca  Date:    04/17/2025  Time:    12:14               Narrative:    EXAMINATION:  XR CHEST 1 VIEW    CLINICAL HISTORY:  altered mental status;, .    COMPARISON:  July 10, 2021    FINDINGS:  No alveolar consolidation, effusion, or pneumothorax is seen.   The thoracic aorta is normal  cardiac silhouette, central pulmonary vessels and mediastinum are normal in size and are grossly unremarkable.   visualized osseous structures are grossly unremarkable.                                       Medications   0.9% NaCl infusion (0 mLs Intravenous Stopped 4/17/25 1600)   morphine injection 2 mg (2 mg Intravenous Given 4/17/25 1402)   ondansetron injection 4 mg (4 mg Intravenous Given 4/17/25 1402)   HYDROcodone-acetaminophen 5-325 mg per tablet 1 tablet (1 tablet Oral Given 4/17/25 1606)     Medical  Decision Making  Amount and/or Complexity of Data Reviewed  Labs: ordered. Decision-making details documented in ED Course.  Radiology: ordered and independent interpretation performed.    Risk  Prescription drug management.                       ED Course as of 04/23/25 1111   u Apr 17, 2025   1303 Creatinine(!): 2.32 [GM]   1303 Glucose(!): 295 [GM]   1303 BUN(!): 39.8 [GM]   1305 Baseline Cr is approx 1.4-1.8; slightly elevated when compared to prior [GM]   1329 CT head negative  CT lumbar spine: New L1 compression fracture with moderate loss of height, minimal bony retropulsion and mild narrowing of the canal. [GM]   1418 Results discussed with the patient and family.  No neurological deficits on exam.  Patient is resting comfortably right now.  He does have some mild KENTON as well. Paging neurosurgery to discuss.  [GM]   1433 Spoke with Dr. Owens regarding patient's L1 fracture.  Would recommend MRI if able to obtain today however if patient is comfortable and has no neurological deficits may go home with a brace and follow up in clinic. [GM]   1501 Mri will go at 6pm. [GM]   1555 Pain currently a 7/10.  Currently awaiting MRI results and repeat creatinine. [GM]   1639 Creatinine(!): 1.99  Creatinine improved after fluids. I spoke with the patient's family who is also their primary care provider to [facility].  [GM]   1659 Patient's pain is down to a 5/10.  Discussed with the patient and family to continue to hydrate at home to help improve his creatinine that we did make great improvement after fluids here in the emergency department.  Patient was provided with an LSO brace. Results were reviewed with patient. Questions were answered along with a discussion of signs and symptoms to return for. Patient comfortable with plan of discharge. Family and PCP comfortable with plan of care.    [GM]      ED Course User Index  [GM] Altagracia Bedoya MD                                   Clinical Impression:  Final  diagnoses:  [R41.82] Altered mental status  [S32.010A] Closed compression fracture of body of L1 vertebra (Primary)  [E86.0] Dehydration          ED Disposition Condition    Discharge Stable          ED Prescriptions       Medication Sig Dispense Start Date End Date Auth. Provider    HYDROcodone-acetaminophen (NORCO) 5-325 mg per tablet Take 1 tablet by mouth every 6 (six) hours as needed for Pain. 20 tablet 4/17/2025 -- Altagracia Bedoya MD          Follow-up Information       Follow up With Specialties Details Why Contact Info    Bereket Owens MD Neurosurgery Schedule an appointment as soon as possible for a visit  If symptoms worsen, return to the ED [address]  [place] [address] [phone]                   [1]   Social History  Tobacco Use    Smoking status: Never    Smokeless tobacco: Never   Substance Use Topics    Alcohol use: Not Currently    Drug use: Never        Altagracia Bedoya MD  04/23/25 0506

## 2025-04-21 ENCOUNTER — DOCUMENTATION ONLY (OUTPATIENT)
Dept: CASE MANAGEMENT | Facility: HOSPITAL | Age: OVER 89
End: 2025-04-21
Payer: MEDICARE

## 2025-05-03 DIAGNOSIS — S32.010A CLOSED COMPRESSION FRACTURE OF BODY OF L1 VERTEBRA: Primary | ICD-10-CM

## 2025-05-03 DIAGNOSIS — M54.50 ACUTE MIDLINE LOW BACK PAIN WITHOUT SCIATICA: ICD-10-CM

## 2025-05-07 ENCOUNTER — TELEPHONE (OUTPATIENT)
Dept: NEUROSURGERY | Facility: CLINIC | Age: OVER 89
End: 2025-05-07
Payer: MEDICARE

## 2025-05-07 DIAGNOSIS — S32.010A CLOSED COMPRESSION FRACTURE OF L1 LUMBAR VERTEBRA, INITIAL ENCOUNTER: Primary | ICD-10-CM

## 2025-05-07 NOTE — TELEPHONE ENCOUNTER
Patient referred to Dr. Jaimes by Dr. Altagracia Bedoya for closed compression fracture of body of L1 vertebra and acute midline low back pain without sciatica.     MRI Lumbar Spine 4/17/25. Impression:  Acute L1 compression fracture with mild loss of height, minimal bony retropulsion and mild narrowing the spinal canal.  Mild degenerative narrowing of the spinal canal at L1-L4.  Multilevel neural foraminal stenoses as described.    Per referring 4/17/25 ED note:  Patient is a 89-year-old male presenting with worsening low back pain and increased but mild confusion. Patient was seen in mid March after a fall. He had imaging done at that time and there was no evidence of a fracture. He was sent home on medications. After couple of days the pain seemed to worsen. He was started on Ultram and gabapentin. Family believes he had a 2nd fall approximately 6 days after the 1st. His neighbor helped him up but he wasn't seen then. And since then he has continued to have pain in his lower back. No fevers. No headache. No head injury reported. He denies any neck pain. Some decreased p.o. intake. No numbness tingling. No reported incontinence. Pain is best localized to the lower back and is severe. No pain with urination. No skin changes. No chest pain or shortness breath. Patient is not confused today. Family thinks he just seems not quite himself. They think it is the pain moreso than true confusion.     Please review and advise.

## 2025-05-08 NOTE — TELEPHONE ENCOUNTER
Please have the patient come in to clinic next week with upright AP/lateral thoracolumbar x-rays. I have entered the order. Please ensure the patient has been provided a TLSO brace that should be worn anytime the head of bed is greater than 30°.  Please advise the patient to avoid any type of anti-inflammatories as well as this can delay bony healing.  The patient should also avoid any lifting greater than 10 lb and avoid bending, stooping, twisting and prolonged sitting.  Thank you